# Patient Record
Sex: FEMALE | Race: BLACK OR AFRICAN AMERICAN | NOT HISPANIC OR LATINO | Employment: FULL TIME | URBAN - METROPOLITAN AREA
[De-identification: names, ages, dates, MRNs, and addresses within clinical notes are randomized per-mention and may not be internally consistent; named-entity substitution may affect disease eponyms.]

---

## 2023-06-05 ENCOUNTER — HOSPITAL ENCOUNTER (INPATIENT)
Facility: OTHER | Age: 58
LOS: 2 days | Discharge: HOME | DRG: 074 | End: 2023-06-08
Attending: EMERGENCY MEDICINE | Admitting: EMERGENCY MEDICINE
Payer: COMMERCIAL

## 2023-06-05 DIAGNOSIS — R11.2 NAUSEA VOMITING AND DIARRHEA: ICD-10-CM

## 2023-06-05 DIAGNOSIS — R11.10 VOMITING: ICD-10-CM

## 2023-06-05 DIAGNOSIS — R19.7 NAUSEA VOMITING AND DIARRHEA: ICD-10-CM

## 2023-06-05 DIAGNOSIS — E87.29 METABOLIC ACIDOSIS, INCREASED ANION GAP (IAG): ICD-10-CM

## 2023-06-05 DIAGNOSIS — E11.43 DIABETIC GASTROPARESIS: ICD-10-CM

## 2023-06-05 DIAGNOSIS — N17.9 AKI (ACUTE KIDNEY INJURY): Primary | ICD-10-CM

## 2023-06-05 DIAGNOSIS — K31.84 DIABETIC GASTROPARESIS: ICD-10-CM

## 2023-06-05 DIAGNOSIS — R19.7 DIARRHEA, UNSPECIFIED TYPE: ICD-10-CM

## 2023-06-05 LAB
ALBUMIN SERPL BCP-MCNC: 4.8 G/DL (ref 3.5–5.2)
ALP SERPL-CCNC: 142 U/L (ref 55–135)
ALT SERPL W/O P-5'-P-CCNC: 22 U/L (ref 10–44)
ANION GAP SERPL CALC-SCNC: 23 MMOL/L (ref 8–16)
AST SERPL-CCNC: 21 U/L (ref 10–40)
BACTERIA #/AREA URNS HPF: ABNORMAL /HPF
BASOPHILS # BLD AUTO: 0.02 K/UL (ref 0–0.2)
BASOPHILS NFR BLD: 0.2 % (ref 0–1.9)
BILIRUB SERPL-MCNC: 0.5 MG/DL (ref 0.1–1)
BILIRUB UR QL STRIP: NEGATIVE
BUN SERPL-MCNC: 55 MG/DL (ref 6–20)
CALCIUM SERPL-MCNC: 10.4 MG/DL (ref 8.7–10.5)
CHLORIDE SERPL-SCNC: 106 MMOL/L (ref 95–110)
CLARITY UR: ABNORMAL
CO2 SERPL-SCNC: 16 MMOL/L (ref 23–29)
COLOR UR: YELLOW
CREAT SERPL-MCNC: 2.6 MG/DL (ref 0.5–1.4)
DIFFERENTIAL METHOD: ABNORMAL
EOSINOPHIL # BLD AUTO: 0 K/UL (ref 0–0.5)
EOSINOPHIL NFR BLD: 0.2 % (ref 0–8)
ERYTHROCYTE [DISTWIDTH] IN BLOOD BY AUTOMATED COUNT: 13.4 % (ref 11.5–14.5)
EST. GFR  (NO RACE VARIABLE): 21 ML/MIN/1.73 M^2
FIO2: 21
GLUCOSE SERPL-MCNC: 59 MG/DL (ref 70–110)
GLUCOSE UR QL STRIP: ABNORMAL
HCO3 UR-SCNC: 22.5 MMOL/L (ref 24–28)
HCT VFR BLD AUTO: 53.8 % (ref 37–48.5)
HCT VFR BLD CALC: 43 %PCV (ref 36–54)
HGB BLD-MCNC: 15 G/DL
HGB BLD-MCNC: 18.1 G/DL (ref 12–16)
HGB UR QL STRIP: NEGATIVE
HYALINE CASTS #/AREA URNS LPF: 37 /LPF
IMM GRANULOCYTES # BLD AUTO: 0.06 K/UL (ref 0–0.04)
IMM GRANULOCYTES NFR BLD AUTO: 0.5 % (ref 0–0.5)
KETONES UR QL STRIP: ABNORMAL
LEUKOCYTE ESTERASE UR QL STRIP: ABNORMAL
LIPASE SERPL-CCNC: 17 U/L (ref 4–60)
LYMPHOCYTES # BLD AUTO: 1.8 K/UL (ref 1–4.8)
LYMPHOCYTES NFR BLD: 16.1 % (ref 18–48)
MCH RBC QN AUTO: 30.2 PG (ref 27–31)
MCHC RBC AUTO-ENTMCNC: 33.6 G/DL (ref 32–36)
MCV RBC AUTO: 90 FL (ref 82–98)
MICROSCOPIC COMMENT: ABNORMAL
MONOCYTES # BLD AUTO: 0.5 K/UL (ref 0.3–1)
MONOCYTES NFR BLD: 4.5 % (ref 4–15)
NEUTROPHILS # BLD AUTO: 9 K/UL (ref 1.8–7.7)
NEUTROPHILS NFR BLD: 78.5 % (ref 38–73)
NITRITE UR QL STRIP: NEGATIVE
NRBC BLD-RTO: 0 /100 WBC
PCO2 BLDA: 45.4 MMHG (ref 35–45)
PH SMN: 7.3 [PH] (ref 7.35–7.45)
PH UR STRIP: 6 [PH] (ref 5–8)
PLATELET # BLD AUTO: 213 K/UL (ref 150–450)
PLATELET BLD QL SMEAR: ABNORMAL
PMV BLD AUTO: 10.9 FL (ref 9.2–12.9)
PO2 BLDA: 35 MMHG (ref 40–60)
POC BE: -4 MMOL/L
POC IONIZED CALCIUM: 1.2 MMOL/L (ref 1.06–1.42)
POC PCO2 TEMP: 45.4 MMHG
POC PH TEMP: 7.3
POC PO2 TEMP: 35 MMHG
POC SATURATED O2: 60 % (ref 95–100)
POC TCO2: 24 MMOL/L (ref 24–29)
POC TEMPERATURE: ABNORMAL
POCT GLUCOSE: 112 MG/DL (ref 70–110)
POCT GLUCOSE: 59 MG/DL (ref 70–110)
POCT GLUCOSE: 60 MG/DL (ref 70–110)
POTASSIUM BLD-SCNC: 4.3 MMOL/L (ref 3.5–5.1)
POTASSIUM SERPL-SCNC: 4 MMOL/L (ref 3.5–5.1)
PROT SERPL-MCNC: 9 G/DL (ref 6–8.4)
PROT UR QL STRIP: ABNORMAL
RBC # BLD AUTO: 6 M/UL (ref 4–5.4)
RBC #/AREA URNS HPF: 2 /HPF (ref 0–4)
SAMPLE: ABNORMAL
SODIUM BLD-SCNC: 145 MMOL/L (ref 136–145)
SODIUM SERPL-SCNC: 145 MMOL/L (ref 136–145)
SP GR UR STRIP: 1.02 (ref 1–1.03)
SQUAMOUS #/AREA URNS HPF: 37 /HPF
URN SPEC COLLECT METH UR: ABNORMAL
UROBILINOGEN UR STRIP-ACNC: NEGATIVE EU/DL
WBC # BLD AUTO: 11.4 K/UL (ref 3.9–12.7)
WBC #/AREA URNS HPF: 4 /HPF (ref 0–5)
YEAST URNS QL MICRO: ABNORMAL

## 2023-06-05 PROCEDURE — 99900035 HC TECH TIME PER 15 MIN (STAT)

## 2023-06-05 PROCEDURE — 80053 COMPREHEN METABOLIC PANEL: CPT | Performed by: EMERGENCY MEDICINE

## 2023-06-05 PROCEDURE — 81000 URINALYSIS NONAUTO W/SCOPE: CPT | Performed by: PHYSICIAN ASSISTANT

## 2023-06-05 PROCEDURE — 96376 TX/PRO/DX INJ SAME DRUG ADON: CPT

## 2023-06-05 PROCEDURE — G0378 HOSPITAL OBSERVATION PER HR: HCPCS

## 2023-06-05 PROCEDURE — 96372 THER/PROPH/DIAG INJ SC/IM: CPT | Performed by: PHYSICIAN ASSISTANT

## 2023-06-05 PROCEDURE — 83690 ASSAY OF LIPASE: CPT | Performed by: EMERGENCY MEDICINE

## 2023-06-05 PROCEDURE — 25000003 PHARM REV CODE 250: Performed by: PHYSICIAN ASSISTANT

## 2023-06-05 PROCEDURE — 96361 HYDRATE IV INFUSION ADD-ON: CPT

## 2023-06-05 PROCEDURE — 96374 THER/PROPH/DIAG INJ IV PUSH: CPT

## 2023-06-05 PROCEDURE — 82803 BLOOD GASES ANY COMBINATION: CPT

## 2023-06-05 PROCEDURE — 63600175 PHARM REV CODE 636 W HCPCS: Performed by: PHYSICIAN ASSISTANT

## 2023-06-05 PROCEDURE — 82962 GLUCOSE BLOOD TEST: CPT

## 2023-06-05 PROCEDURE — 96375 TX/PRO/DX INJ NEW DRUG ADDON: CPT

## 2023-06-05 PROCEDURE — 85025 COMPLETE CBC W/AUTO DIFF WBC: CPT | Performed by: PHYSICIAN ASSISTANT

## 2023-06-05 RX ORDER — DICYCLOMINE HYDROCHLORIDE 10 MG/ML
20 INJECTION INTRAMUSCULAR
Status: COMPLETED | OUTPATIENT
Start: 2023-06-05 | End: 2023-06-05

## 2023-06-05 RX ORDER — ONDANSETRON 2 MG/ML
4 INJECTION INTRAMUSCULAR; INTRAVENOUS
Status: COMPLETED | OUTPATIENT
Start: 2023-06-05 | End: 2023-06-05

## 2023-06-05 RX ORDER — TALC
6 POWDER (GRAM) TOPICAL NIGHTLY PRN
Status: DISCONTINUED | OUTPATIENT
Start: 2023-06-06 | End: 2023-06-08 | Stop reason: HOSPADM

## 2023-06-05 RX ORDER — ONDANSETRON 2 MG/ML
4 INJECTION INTRAMUSCULAR; INTRAVENOUS EVERY 8 HOURS PRN
Status: DISCONTINUED | OUTPATIENT
Start: 2023-06-06 | End: 2023-06-06

## 2023-06-05 RX ORDER — SODIUM CHLORIDE 0.9 % (FLUSH) 0.9 %
10 SYRINGE (ML) INJECTION
Status: DISCONTINUED | OUTPATIENT
Start: 2023-06-06 | End: 2023-06-08 | Stop reason: HOSPADM

## 2023-06-05 RX ORDER — LOPERAMIDE HYDROCHLORIDE 2 MG/1
4 CAPSULE ORAL ONCE AS NEEDED
Status: COMPLETED | OUTPATIENT
Start: 2023-06-06 | End: 2023-06-08

## 2023-06-05 RX ORDER — ACETAMINOPHEN 325 MG/1
650 TABLET ORAL EVERY 8 HOURS PRN
Status: DISCONTINUED | OUTPATIENT
Start: 2023-06-06 | End: 2023-06-06

## 2023-06-05 RX ORDER — GLUCAGON 1 MG
1 KIT INJECTION
Status: DISCONTINUED | OUTPATIENT
Start: 2023-06-06 | End: 2023-06-08 | Stop reason: HOSPADM

## 2023-06-05 RX ORDER — LOPERAMIDE HYDROCHLORIDE 2 MG/1
4 CAPSULE ORAL ONCE
Status: DISCONTINUED | OUTPATIENT
Start: 2023-06-06 | End: 2023-06-05

## 2023-06-05 RX ORDER — HYDROCODONE BITARTRATE AND ACETAMINOPHEN 5; 325 MG/1; MG/1
1 TABLET ORAL EVERY 4 HOURS PRN
Status: DISCONTINUED | OUTPATIENT
Start: 2023-06-06 | End: 2023-06-08 | Stop reason: HOSPADM

## 2023-06-05 RX ORDER — SODIUM CHLORIDE 9 MG/ML
1000 INJECTION, SOLUTION INTRAVENOUS
Status: COMPLETED | OUTPATIENT
Start: 2023-06-05 | End: 2023-06-06

## 2023-06-05 RX ORDER — PROMETHAZINE HYDROCHLORIDE 12.5 MG/1
25 TABLET ORAL EVERY 6 HOURS PRN
Status: DISCONTINUED | OUTPATIENT
Start: 2023-06-06 | End: 2023-06-06

## 2023-06-05 RX ORDER — INSULIN ASPART 100 [IU]/ML
0-5 INJECTION, SOLUTION INTRAVENOUS; SUBCUTANEOUS EVERY 6 HOURS PRN
Status: DISCONTINUED | OUTPATIENT
Start: 2023-06-06 | End: 2023-06-06

## 2023-06-05 RX ADMIN — SODIUM CHLORIDE 1000 ML: 9 INJECTION, SOLUTION INTRAVENOUS at 11:06

## 2023-06-05 RX ADMIN — DEXTROSE MONOHYDRATE 125 ML: 100 INJECTION, SOLUTION INTRAVENOUS at 09:06

## 2023-06-05 RX ADMIN — ONDANSETRON 4 MG: 2 INJECTION INTRAMUSCULAR; INTRAVENOUS at 08:06

## 2023-06-05 RX ADMIN — ONDANSETRON 4 MG: 2 INJECTION INTRAMUSCULAR; INTRAVENOUS at 09:06

## 2023-06-05 RX ADMIN — SODIUM CHLORIDE 1000 ML: 9 INJECTION, SOLUTION INTRAVENOUS at 08:06

## 2023-06-05 RX ADMIN — DICYCLOMINE HYDROCHLORIDE 20 MG: 10 INJECTION, SOLUTION INTRAMUSCULAR at 09:06

## 2023-06-05 NOTE — LETTER
June 8, 2023         [address]  [facility] [address]  Phone: [phone]  Fax: [phone]       Patient: Lindsay Lou   YOB: 1965  Date of Visit: 06/08/2023    To Whom It May Concern:    Lindsay Lou  was under my care 6/5/23-6/8/23, please excuse her for any absences. The patient may return to work on 6/11/23 with no restrictions. If you have any questions or concerns, or if I can be of further assistance, please do not hesitate to contact me.    Sincerely,    Venkatesh Colmenares MD

## 2023-06-05 NOTE — Clinical Note
Diagnosis: KORY (acute kidney injury) [475832]   Future Attending Provider: TOR FORD [[id number]]   Is the patient being sent to ED Observation?: Yes   Admitting Provider:: TOR FORD [[id number]]

## 2023-06-06 PROBLEM — R11.2 INTRACTABLE NAUSEA AND VOMITING: Status: ACTIVE | Noted: 2021-05-13

## 2023-06-06 PROBLEM — F17.210 CIGARETTE NICOTINE DEPENDENCE WITHOUT COMPLICATION: Chronic | Status: ACTIVE | Noted: 2023-06-06

## 2023-06-06 PROBLEM — N17.9 AKI (ACUTE KIDNEY INJURY): Status: ACTIVE | Noted: 2023-06-06

## 2023-06-06 PROBLEM — I10 ESSENTIAL HYPERTENSION: Status: ACTIVE | Noted: 2023-06-06

## 2023-06-06 PROBLEM — E87.29 METABOLIC ACIDOSIS, INCREASED ANION GAP (IAG): Status: ACTIVE | Noted: 2023-06-06

## 2023-06-06 PROBLEM — R19.7 DIARRHEA: Status: ACTIVE | Noted: 2023-06-06

## 2023-06-06 PROBLEM — R19.7 NAUSEA VOMITING AND DIARRHEA: Status: ACTIVE | Noted: 2021-05-13

## 2023-06-06 LAB
ALBUMIN SERPL BCP-MCNC: 3.9 G/DL (ref 3.5–5.2)
ALP SERPL-CCNC: 116 U/L (ref 55–135)
ALT SERPL W/O P-5'-P-CCNC: 19 U/L (ref 10–44)
ANION GAP SERPL CALC-SCNC: 12 MMOL/L (ref 8–16)
ANION GAP SERPL CALC-SCNC: 13 MMOL/L (ref 8–16)
AST SERPL-CCNC: 20 U/L (ref 10–40)
B-OH-BUTYR BLD STRIP-SCNC: 1.3 MMOL/L (ref 0–0.5)
BASOPHILS # BLD AUTO: 0.01 K/UL (ref 0–0.2)
BASOPHILS NFR BLD: 0.1 % (ref 0–1.9)
BILIRUB SERPL-MCNC: 0.5 MG/DL (ref 0.1–1)
BUN SERPL-MCNC: 39 MG/DL (ref 6–20)
BUN SERPL-MCNC: 39 MG/DL (ref 6–20)
CALCIUM SERPL-MCNC: 8.9 MG/DL (ref 8.7–10.5)
CALCIUM SERPL-MCNC: 9.1 MG/DL (ref 8.7–10.5)
CHLORIDE SERPL-SCNC: 111 MMOL/L (ref 95–110)
CHLORIDE SERPL-SCNC: 111 MMOL/L (ref 95–110)
CO2 SERPL-SCNC: 21 MMOL/L (ref 23–29)
CO2 SERPL-SCNC: 21 MMOL/L (ref 23–29)
CREAT SERPL-MCNC: 1.6 MG/DL (ref 0.5–1.4)
CREAT SERPL-MCNC: 1.6 MG/DL (ref 0.5–1.4)
DIFFERENTIAL METHOD: ABNORMAL
EOSINOPHIL # BLD AUTO: 0 K/UL (ref 0–0.5)
EOSINOPHIL NFR BLD: 0 % (ref 0–8)
ERYTHROCYTE [DISTWIDTH] IN BLOOD BY AUTOMATED COUNT: 13.5 % (ref 11.5–14.5)
EST. GFR  (NO RACE VARIABLE): 37 ML/MIN/1.73 M^2
EST. GFR  (NO RACE VARIABLE): 37 ML/MIN/1.73 M^2
FIO2: 21
GLUCOSE SERPL-MCNC: 100 MG/DL (ref 70–110)
GLUCOSE SERPL-MCNC: 96 MG/DL (ref 70–110)
HCT VFR BLD AUTO: 42.6 % (ref 37–48.5)
HGB BLD-MCNC: 14.6 G/DL (ref 12–16)
IMM GRANULOCYTES # BLD AUTO: 0.05 K/UL (ref 0–0.04)
IMM GRANULOCYTES NFR BLD AUTO: 0.5 % (ref 0–0.5)
LDH SERPL L TO P-CCNC: 0.58 MMOL/L (ref 0.5–2.2)
LYMPHOCYTES # BLD AUTO: 1.8 K/UL (ref 1–4.8)
LYMPHOCYTES NFR BLD: 17.3 % (ref 18–48)
MCH RBC QN AUTO: 30.4 PG (ref 27–31)
MCHC RBC AUTO-ENTMCNC: 34.3 G/DL (ref 32–36)
MCV RBC AUTO: 89 FL (ref 82–98)
MONOCYTES # BLD AUTO: 0.5 K/UL (ref 0.3–1)
MONOCYTES NFR BLD: 5 % (ref 4–15)
NEUTROPHILS # BLD AUTO: 7.9 K/UL (ref 1.8–7.7)
NEUTROPHILS NFR BLD: 77.1 % (ref 38–73)
NRBC BLD-RTO: 0 /100 WBC
PLATELET # BLD AUTO: 295 K/UL (ref 150–450)
PMV BLD AUTO: 9.6 FL (ref 9.2–12.9)
POC TEMPERATURE: NORMAL
POCT GLUCOSE: 104 MG/DL (ref 70–110)
POCT GLUCOSE: 113 MG/DL (ref 70–110)
POCT GLUCOSE: 128 MG/DL (ref 70–110)
POCT GLUCOSE: 169 MG/DL (ref 70–110)
POCT GLUCOSE: 43 MG/DL (ref 70–110)
POCT GLUCOSE: 59 MG/DL (ref 70–110)
POCT GLUCOSE: 61 MG/DL (ref 70–110)
POCT GLUCOSE: 62 MG/DL (ref 70–110)
POCT GLUCOSE: 68 MG/DL (ref 70–110)
POCT GLUCOSE: 78 MG/DL (ref 70–110)
POCT GLUCOSE: 88 MG/DL (ref 70–110)
POCT GLUCOSE: 92 MG/DL (ref 70–110)
POTASSIUM SERPL-SCNC: 3.9 MMOL/L (ref 3.5–5.1)
POTASSIUM SERPL-SCNC: 4 MMOL/L (ref 3.5–5.1)
PROT SERPL-MCNC: 7.3 G/DL (ref 6–8.4)
RBC # BLD AUTO: 4.81 M/UL (ref 4–5.4)
SAMPLE: NORMAL
SODIUM SERPL-SCNC: 144 MMOL/L (ref 136–145)
SODIUM SERPL-SCNC: 145 MMOL/L (ref 136–145)
WBC # BLD AUTO: 10.25 K/UL (ref 3.9–12.7)

## 2023-06-06 PROCEDURE — 63600175 PHARM REV CODE 636 W HCPCS

## 2023-06-06 PROCEDURE — 63600175 PHARM REV CODE 636 W HCPCS: Performed by: PHYSICIAN ASSISTANT

## 2023-06-06 PROCEDURE — 25000003 PHARM REV CODE 250: Performed by: PHYSICIAN ASSISTANT

## 2023-06-06 PROCEDURE — 96361 HYDRATE IV INFUSION ADD-ON: CPT

## 2023-06-06 PROCEDURE — 99223 1ST HOSP IP/OBS HIGH 75: CPT | Mod: ,,,

## 2023-06-06 PROCEDURE — 82010 KETONE BODYS QUAN: CPT | Performed by: EMERGENCY MEDICINE

## 2023-06-06 PROCEDURE — 93005 ELECTROCARDIOGRAM TRACING: CPT

## 2023-06-06 PROCEDURE — 36415 COLL VENOUS BLD VENIPUNCTURE: CPT | Performed by: EMERGENCY MEDICINE

## 2023-06-06 PROCEDURE — 93010 EKG 12-LEAD: ICD-10-PCS | Mod: ,,, | Performed by: INTERNAL MEDICINE

## 2023-06-06 PROCEDURE — 80053 COMPREHEN METABOLIC PANEL: CPT | Performed by: PHYSICIAN ASSISTANT

## 2023-06-06 PROCEDURE — 25000003 PHARM REV CODE 250

## 2023-06-06 PROCEDURE — 99900035 HC TECH TIME PER 15 MIN (STAT)

## 2023-06-06 PROCEDURE — 21400001 HC TELEMETRY ROOM

## 2023-06-06 PROCEDURE — 85025 COMPLETE CBC W/AUTO DIFF WBC: CPT | Performed by: PHYSICIAN ASSISTANT

## 2023-06-06 PROCEDURE — 63600175 PHARM REV CODE 636 W HCPCS: Performed by: EMERGENCY MEDICINE

## 2023-06-06 PROCEDURE — 80048 BASIC METABOLIC PNL TOTAL CA: CPT | Mod: XB | Performed by: EMERGENCY MEDICINE

## 2023-06-06 PROCEDURE — 96374 THER/PROPH/DIAG INJ IV PUSH: CPT | Mod: 59

## 2023-06-06 PROCEDURE — 96375 TX/PRO/DX INJ NEW DRUG ADDON: CPT

## 2023-06-06 PROCEDURE — 83605 ASSAY OF LACTIC ACID: CPT

## 2023-06-06 PROCEDURE — 96376 TX/PRO/DX INJ SAME DRUG ADON: CPT

## 2023-06-06 PROCEDURE — 99223 PR INITIAL HOSPITAL CARE,LEVL III: ICD-10-PCS | Mod: ,,,

## 2023-06-06 PROCEDURE — 82962 GLUCOSE BLOOD TEST: CPT

## 2023-06-06 PROCEDURE — 93010 ELECTROCARDIOGRAM REPORT: CPT | Mod: ,,, | Performed by: INTERNAL MEDICINE

## 2023-06-06 PROCEDURE — 99285 EMERGENCY DEPT VISIT HI MDM: CPT | Mod: 25

## 2023-06-06 RX ORDER — SUCRALFATE 1 G/10ML
1 SUSPENSION ORAL EVERY 8 HOURS PRN
Status: DISCONTINUED | OUTPATIENT
Start: 2023-06-06 | End: 2023-06-06

## 2023-06-06 RX ORDER — BLOOD-GLUCOSE,RECEIVER,CONT
1 EACH MISCELLANEOUS
COMMUNITY
Start: 2022-10-20

## 2023-06-06 RX ORDER — SODIUM CHLORIDE 9 MG/ML
INJECTION, SOLUTION INTRAVENOUS CONTINUOUS
Status: DISCONTINUED | OUTPATIENT
Start: 2023-06-06 | End: 2023-06-07

## 2023-06-06 RX ORDER — HYDRALAZINE HYDROCHLORIDE 20 MG/ML
10 INJECTION INTRAMUSCULAR; INTRAVENOUS EVERY 8 HOURS PRN
Status: DISCONTINUED | OUTPATIENT
Start: 2023-06-06 | End: 2023-06-08 | Stop reason: HOSPADM

## 2023-06-06 RX ORDER — INSULIN ASPART 100 [IU]/ML
0-5 INJECTION, SOLUTION INTRAVENOUS; SUBCUTANEOUS EVERY 4 HOURS PRN
Status: DISCONTINUED | OUTPATIENT
Start: 2023-06-06 | End: 2023-06-08 | Stop reason: HOSPADM

## 2023-06-06 RX ORDER — DEXTROSE 40 %
15 GEL (GRAM) ORAL
Status: DISCONTINUED | OUTPATIENT
Start: 2023-06-06 | End: 2023-06-08 | Stop reason: HOSPADM

## 2023-06-06 RX ORDER — LISINOPRIL 20 MG/1
20 TABLET ORAL DAILY
COMMUNITY
Start: 2023-05-08

## 2023-06-06 RX ORDER — BLOOD-GLUCOSE TRANSMITTER
1 EACH MISCELLANEOUS
COMMUNITY
Start: 2022-10-20

## 2023-06-06 RX ORDER — DEXTROSE, SODIUM CHLORIDE, SODIUM LACTATE, POTASSIUM CHLORIDE, AND CALCIUM CHLORIDE 5; .6; .31; .03; .02 G/100ML; G/100ML; G/100ML; G/100ML; G/100ML
INJECTION, SOLUTION INTRAVENOUS
Status: COMPLETED | OUTPATIENT
Start: 2023-06-06 | End: 2023-06-06

## 2023-06-06 RX ORDER — BLOOD-GLUCOSE SENSOR
EACH MISCELLANEOUS 4 TIMES DAILY
COMMUNITY
Start: 2023-05-08

## 2023-06-06 RX ORDER — SUCRALFATE 1 G/10ML
1 SUSPENSION ORAL EVERY 8 HOURS PRN
Status: DISCONTINUED | OUTPATIENT
Start: 2023-06-06 | End: 2023-06-08 | Stop reason: HOSPADM

## 2023-06-06 RX ORDER — ONDANSETRON 2 MG/ML
4 INJECTION INTRAMUSCULAR; INTRAVENOUS EVERY 8 HOURS
Status: DISCONTINUED | OUTPATIENT
Start: 2023-06-06 | End: 2023-06-08 | Stop reason: HOSPADM

## 2023-06-06 RX ORDER — EMPAGLIFLOZIN 10 MG/1
10 TABLET, FILM COATED ORAL EVERY MORNING
COMMUNITY
Start: 2023-05-08

## 2023-06-06 RX ORDER — METOCLOPRAMIDE HYDROCHLORIDE 5 MG/ML
10 INJECTION INTRAMUSCULAR; INTRAVENOUS
Status: COMPLETED | OUTPATIENT
Start: 2023-06-06 | End: 2023-06-06

## 2023-06-06 RX ORDER — DEXTROSE 40 %
30 GEL (GRAM) ORAL
Status: DISCONTINUED | OUTPATIENT
Start: 2023-06-06 | End: 2023-06-08 | Stop reason: HOSPADM

## 2023-06-06 RX ORDER — LISINOPRIL 20 MG/1
20 TABLET ORAL DAILY
Status: DISCONTINUED | OUTPATIENT
Start: 2023-06-07 | End: 2023-06-06

## 2023-06-06 RX ORDER — SCOLOPAMINE TRANSDERMAL SYSTEM 1 MG/1
1 PATCH, EXTENDED RELEASE TRANSDERMAL
Status: DISCONTINUED | OUTPATIENT
Start: 2023-06-06 | End: 2023-06-08 | Stop reason: HOSPADM

## 2023-06-06 RX ORDER — ACETAMINOPHEN 500 MG
1000 TABLET ORAL EVERY 8 HOURS PRN
Status: DISCONTINUED | OUTPATIENT
Start: 2023-06-06 | End: 2023-06-08 | Stop reason: HOSPADM

## 2023-06-06 RX ORDER — MAG HYDROX/ALUMINUM HYD/SIMETH 200-200-20
30 SUSPENSION, ORAL (FINAL DOSE FORM) ORAL
Status: DISCONTINUED | OUTPATIENT
Start: 2023-06-06 | End: 2023-06-06

## 2023-06-06 RX ORDER — NAPROXEN SODIUM 220 MG/1
TABLET, FILM COATED ORAL
COMMUNITY

## 2023-06-06 RX ORDER — ONDANSETRON 2 MG/ML
4 INJECTION INTRAMUSCULAR; INTRAVENOUS
Status: COMPLETED | OUTPATIENT
Start: 2023-06-06 | End: 2023-06-06

## 2023-06-06 RX ORDER — MAG HYDROX/ALUMINUM HYD/SIMETH 200-200-20
30 SUSPENSION, ORAL (FINAL DOSE FORM) ORAL EVERY 6 HOURS PRN
Status: DISCONTINUED | OUTPATIENT
Start: 2023-06-06 | End: 2023-06-08 | Stop reason: HOSPADM

## 2023-06-06 RX ORDER — PANTOPRAZOLE SODIUM 40 MG/10ML
40 INJECTION, POWDER, LYOPHILIZED, FOR SOLUTION INTRAVENOUS DAILY
Status: DISCONTINUED | OUTPATIENT
Start: 2023-06-07 | End: 2023-06-07

## 2023-06-06 RX ORDER — DICYCLOMINE HYDROCHLORIDE 10 MG/ML
20 INJECTION INTRAMUSCULAR 3 TIMES DAILY
Status: DISCONTINUED | OUTPATIENT
Start: 2023-06-06 | End: 2023-06-06

## 2023-06-06 RX ORDER — DICYCLOMINE HYDROCHLORIDE 10 MG/1
20 CAPSULE ORAL 3 TIMES DAILY
Status: DISCONTINUED | OUTPATIENT
Start: 2023-06-06 | End: 2023-06-08 | Stop reason: HOSPADM

## 2023-06-06 RX ORDER — INSULIN GLARGINE 100 [IU]/ML
28 INJECTION, SOLUTION SUBCUTANEOUS NIGHTLY
COMMUNITY

## 2023-06-06 RX ORDER — ERGOCALCIFEROL 1.25 MG/1
50000 CAPSULE ORAL
COMMUNITY
Start: 2023-05-08

## 2023-06-06 RX ORDER — NAPROXEN SODIUM 220 MG/1
81 TABLET, FILM COATED ORAL DAILY
Status: DISCONTINUED | OUTPATIENT
Start: 2023-06-07 | End: 2023-06-08 | Stop reason: HOSPADM

## 2023-06-06 RX ADMIN — INSULIN DETEMIR 14 UNITS: 100 INJECTION, SOLUTION SUBCUTANEOUS at 08:06

## 2023-06-06 RX ADMIN — ONDANSETRON 4 MG: 2 INJECTION INTRAMUSCULAR; INTRAVENOUS at 09:06

## 2023-06-06 RX ADMIN — DEXTROSE MONOHYDRATE 125 ML: 100 INJECTION, SOLUTION INTRAVENOUS at 02:06

## 2023-06-06 RX ADMIN — DEXTROSE MONOHYDRATE 250 ML: 100 INJECTION, SOLUTION INTRAVENOUS at 08:06

## 2023-06-06 RX ADMIN — ACETAMINOPHEN 650 MG: 325 TABLET, FILM COATED ORAL at 08:06

## 2023-06-06 RX ADMIN — DICYCLOMINE HYDROCHLORIDE 20 MG: 10 CAPSULE ORAL at 09:06

## 2023-06-06 RX ADMIN — METOCLOPRAMIDE 10 MG: 5 INJECTION, SOLUTION INTRAMUSCULAR; INTRAVENOUS at 11:06

## 2023-06-06 RX ADMIN — DEXTROSE MONOHYDRATE 250 ML: 100 INJECTION, SOLUTION INTRAVENOUS at 04:06

## 2023-06-06 RX ADMIN — PROMETHAZINE HYDROCHLORIDE 25 MG: 25 TABLET ORAL at 09:06

## 2023-06-06 RX ADMIN — PROMETHAZINE HYDROCHLORIDE 6.25 MG: 25 INJECTION INTRAMUSCULAR; INTRAVENOUS at 08:06

## 2023-06-06 RX ADMIN — ONDANSETRON 4 MG: 2 INJECTION INTRAMUSCULAR; INTRAVENOUS at 08:06

## 2023-06-06 RX ADMIN — DEXTROSE, SODIUM CHLORIDE, SODIUM LACTATE, POTASSIUM CHLORIDE, AND CALCIUM CHLORIDE: 5; .6; .31; .03; .02 INJECTION, SOLUTION INTRAVENOUS at 11:06

## 2023-06-06 RX ADMIN — HYDRALAZINE HYDROCHLORIDE 10 MG: 20 INJECTION INTRAMUSCULAR; INTRAVENOUS at 07:06

## 2023-06-06 RX ADMIN — SCOLOPAMINE TRANSDERMAL SYSTEM 1 PATCH: 1 PATCH, EXTENDED RELEASE TRANSDERMAL at 04:06

## 2023-06-06 RX ADMIN — DEXTROSE MONOHYDRATE 125 ML: 100 INJECTION, SOLUTION INTRAVENOUS at 12:06

## 2023-06-06 RX ADMIN — SODIUM CHLORIDE: 9 INJECTION, SOLUTION INTRAVENOUS at 03:06

## 2023-06-06 RX ADMIN — ONDANSETRON 4 MG: 2 INJECTION INTRAMUSCULAR; INTRAVENOUS at 04:06

## 2023-06-06 RX ADMIN — ONDANSETRON 4 MG: 2 INJECTION INTRAMUSCULAR; INTRAVENOUS at 01:06

## 2023-06-06 NOTE — ED NOTES
Pt continues to c/o nausea. Pt received 2 doses of zofran IV in ED tonight. MD notified. Received verbal order from Dr Sherwood to give additional dose of 4mg zofran IV at this time. 3rd dose confirmed/verified with md.

## 2023-06-06 NOTE — ED NOTES
CBG 43. Pt denies any symptoms. Provided with 2 orange juices PO and Dextrose 10% IV. VIPIN Dinh notified. Will continue to monitor.

## 2023-06-06 NOTE — ED NOTES
Pt assigned to room 313. SBAR completed. Pt to be transferred via wheelchair to 313 with personal belongings. Safety measures in place.

## 2023-06-06 NOTE — NURSING TRANSFER
Nursing Transfer Note      6/6/2023     Reason patient is being transferred: to 313    Transfer From: ED    Transfer via wheelchair    Transfer with cardiac monitoring    Transported by transport staff    Telemetry: [id number]    Medicines sent: No    Any special needs or follow-up needed: No    Chart send with patient: No    Notified: pt notified family

## 2023-06-06 NOTE — ASSESSMENT & PLAN NOTE
"Chronic. Uncontrolled. Ongoing nausea.   Patient reports not taking prescribed lisinopril as her BP is "well-controlled". However, she does not appear to check her BP, not at least for about a month already. Also appears to have omitted aspirin, lovastatin from her daily med regimen as well. Counseled extensively on compliance to meds and checking BP, amendable to change.     - Hold home dose lisinopril in the setting of KORY  - Continue aspirin 81mg daily, lovastatin 10mg daily qhs  - IV hydralazine 10mg Q6 PRN for SBP >180 DBP >100  - Educate patient about continuing prescribed medications, daily BP checks, low salt diet when more comfortable   "

## 2023-06-06 NOTE — NURSING
Nurses Note -- 4 Eyes      6/6/2023   4:00 PM      Skin assessed during: Admit      [x] No Altered Skin Integrity Present    []Prevention Measures Documented      [] Yes- Altered Skin Integrity Present or Discovered   [] LDA Added if Not in Epic (Describe Wound)   [] New Altered Skin Integrity was Present on Admit and Documented in LDA   [] Wound Image Taken    Wound Care Consulted? No    Attending Nurse:  Kacie Hines RN     Second RN/Staff Member:  Augustina Cee LPN

## 2023-06-06 NOTE — ASSESSMENT & PLAN NOTE
Patient's FSGs are uncontrolled due to hypoglycemia on current medication regimen likely due to impaired oral intake for the last 2-3 days.     Last A1c reviewed-   Lab Results   Component Value Date    HGBA1C 11.8 (H) 12/22/2022     Most recent fingerstick glucose reviewed-   Recent Labs   Lab 06/06/23  0759 06/06/23  0854 06/06/23  1101 06/06/23  1159   POCTGLUCOSE 43* 169* 104 88     Current correctional scale  Low  Maintain anti-hyperglycemic dose as follows-   Antihyperglycemics (From admission, onward)    Start     Stop Route Frequency Ordered    06/06/23 2100  insulin detemir U-100 (Levemir) pen 14 Units         -- SubQ Nightly 06/06/23 1511    06/06/23 1645  insulin aspart U-100 pen 0-5 Units         -- SubQ Every 4 hours PRN 06/06/23 1546        Home meds:  Insulin Lantus 28U qhs  Insulin novolog 5U at lunch daily  Jardiance 10mg daily    Inpatient:  Decreased detemir to 14U qhs in the setting of hypolgycemia  POCT BG Q4 hrs for now until able to take orally, with correctional insulin aspart PRN  Hold Jardiance

## 2023-06-06 NOTE — ED PROVIDER NOTES
Encounter Date: 6/5/2023       History     Chief Complaint   Patient presents with    Vomiting     N/v/d x 2 days. Reports being seen at urgent care and sent to get fluids.      57-year-old female with history of type 2 diabetes on insulin presents ER for evaluation of nausea, vomiting and diarrhea ongoing for last 2 and half days.  Patient reports multiple episodes over last couple days.  Had 3 episodes of vomiting and diarrhea today.  Denies any abdominal pain but has had some very minimal cramping.  Denies any flank pain or UTI symptoms including dysuria hematuria.  She denies any fever chills green no known sick contacts.  No changes in diet or medication.  No recent antibiotic use.  She denies recent travel.  She did try to take over-the-counter medication but was not able to tolerate the medicine due to vomiting.  Patient does report taking her insulin this morning.  Has not had anything to eat for the last 2 days.  She went to urgent care but was told to come to the ER for IV hydration.  Denies any cough, congestion, URI symptoms.    The history is provided by the patient.   Review of patient's allergies indicates:   Allergen Reactions    Cephalexin Nausea And Vomiting    Pcn [penicillins] Nausea And Vomiting     Past Medical History:   Diagnosis Date    Abnormal cervical Papanicolaou smear ? 1990    colposcopy    Diabetes mellitus      No past surgical history on file.  Family History   Problem Relation Age of Onset    Colon cancer Neg Hx     Ovarian cancer Neg Hx     Breast cancer Maternal Aunt      Social History     Tobacco Use    Smoking status: Some Days    Smokeless tobacco: Never   Substance Use Topics    Alcohol use: No    Drug use: No     Review of Systems   Constitutional:  Negative for chills and fever.   HENT:  Negative for congestion.    Eyes:  Negative for visual disturbance.   Respiratory:  Negative for shortness of breath.    Cardiovascular:  Negative for chest pain.   Gastrointestinal:   Positive for diarrhea, nausea and vomiting. Negative for abdominal pain.   Genitourinary:  Negative for dysuria and flank pain.   Musculoskeletal:  Negative for myalgias.   Skin:  Negative for rash.   Allergic/Immunologic: Negative for immunocompromised state.   Neurological:  Negative for weakness and numbness.   Hematological:  Does not bruise/bleed easily.   Psychiatric/Behavioral:  Negative for confusion.      Physical Exam     Initial Vitals [06/05/23 1938]   BP Pulse Resp Temp SpO2   (!) 160/95 (!) 117 20 98.1 °F (36.7 °C) 100 %      MAP       --         Physical Exam    Vitals reviewed.  Constitutional: She appears well-developed and well-nourished. She is not diaphoretic. No distress.   HENT:   Head: Normocephalic and atraumatic.   Eyes: Conjunctivae and EOM are normal.   Neck: Neck supple.   Cardiovascular:  Regular rhythm.   Tachycardia present.         Pulmonary/Chest: No respiratory distress.   Abdominal: Abdomen is soft. Bowel sounds are normal. There is abdominal tenderness in the left lower quadrant.   No right CVA tenderness.  No left CVA tenderness. There is no rebound and no guarding.   Musculoskeletal:         General: Normal range of motion.      Cervical back: Neck supple.     Neurological: She is alert and oriented to person, place, and time.       ED Course   Procedures  Labs Reviewed   CBC W/ AUTO DIFFERENTIAL - Abnormal; Notable for the following components:       Result Value    RBC 6.00 (*)     Hemoglobin 18.1 (*)     Hematocrit 53.8 (*)     Gran # (ANC) 9.0 (*)     Immature Grans (Abs) 0.06 (*)     Gran % 78.5 (*)     Lymph % 16.1 (*)     All other components within normal limits   URINALYSIS, REFLEX TO URINE CULTURE - Abnormal; Notable for the following components:    Appearance, UA Hazy (*)     Protein, UA 1+ (*)     Glucose, UA 4+ (*)     Ketones, UA 2+ (*)     Leukocytes, UA 1+ (*)     All other components within normal limits    Narrative:     Specimen Source->Urine   COMPREHENSIVE  METABOLIC PANEL - Abnormal; Notable for the following components:    CO2 16 (*)     Glucose 59 (*)     BUN 55 (*)     Creatinine 2.6 (*)     Total Protein 9.0 (*)     Alkaline Phosphatase 142 (*)     Anion Gap 23 (*)     eGFR 21 (*)     All other components within normal limits   URINALYSIS MICROSCOPIC - Abnormal; Notable for the following components:    Bacteria Few (*)     Hyaline Casts, UA 37 (*)     All other components within normal limits    Narrative:     Specimen Source->Urine   POCT GLUCOSE - Abnormal; Notable for the following components:    POCT Glucose 60 (*)     All other components within normal limits   POCT GLUCOSE - Abnormal; Notable for the following components:    POCT Glucose 59 (*)     All other components within normal limits   POCT GLUCOSE - Abnormal; Notable for the following components:    POCT Glucose 112 (*)     All other components within normal limits   ISTAT PROCEDURE - Abnormal; Notable for the following components:    POC PH 7.303 (*)     POC PCO2 45.4 (*)     POC PO2 35 (*)     POC HCO3 22.5 (*)     POC SATURATED O2 60 (*)     All other components within normal limits   LIPASE   POCT GLUCOSE MONITORING CONTINUOUS   POCT GLUCOSE MONITORING CONTINUOUS   POCT GLUCOSE MONITORING CONTINUOUS          Imaging Results              CT Abdomen Pelvis  Without Contrast (Final result)  Result time 06/05/23 21:48:59   Procedure changed from CT Abdomen Pelvis With Contrast     Final result by Candy Whitfield MD (06/05/23 21:48:59)                   Impression:      1. No acute intra-abdominal abnormalities identified.  2. Cholelithiasis.  3. Two separate IUDs versus IUD fragments seen within the uterus.  Correlate with patient clinical history.      Electronically signed by: Candy Whitfield MD  Date:    06/05/2023  Time:    21:48               Narrative:    EXAMINATION:  CT ABDOMEN PELVIS WITHOUT CONTRAST    CLINICAL HISTORY:  LLQ abdominal pain;Nausea/vomiting;    TECHNIQUE:  Low dose axial  images, sagittal and coronal reformations were obtained from the lung bases to the pubic symphysis.  Oral contrast was not administered.    COMPARISON:  None    FINDINGS:  The visualized portion of the heart is unremarkable.  The lung bases are clear.    Small nonspecific parenchymal calcifications are seen within the inferior aspect of the right hepatic lobe.  Otherwise no significant hepatic abnormality seen on this noncontrast exam.  There is no intra-or extrahepatic biliary ductal dilatation.  There is cholelithiasis.  The stomach, pancreas, spleen, and adrenal glands are unremarkable.    Kidneys show no evidence of stones or hydronephrosis. Ureters are unable be tracked.  Urinary bladder is unremarkable.  Two IUDs versus separate IUD fragments are seen within the uterus.    Appendix is not definitely visualized, however no abnormalities or inflammatory changes are seen in the region.  The visualized loops of small and large bowel show no evidence of obstruction or inflammation.  No free air or free fluid.    Aorta tapers normally.    No acute osseous abnormality identified. Small fat containing umbilical hernia is noted.                                       Medications   dextrose 10% bolus 125 mL 125 mL (0 mLs Intravenous Stopped 6/5/23 0931)   dextrose 10% bolus 250 mL 250 mL (has no administration in time range)   sodium chloride 0.9% flush 10 mL (has no administration in time range)   melatonin tablet 6 mg (has no administration in time range)   acetaminophen tablet 650 mg (has no administration in time range)   HYDROcodone-acetaminophen 5-325 mg per tablet 1 tablet (has no administration in time range)   ondansetron injection 4 mg (has no administration in time range)   promethazine tablet 25 mg (has no administration in time range)   glucagon (human recombinant) injection 1 mg (has no administration in time range)   dextrose 10% bolus 125 mL 125 mL (has no administration in time range)   dextrose 10%  bolus 250 mL 250 mL (has no administration in time range)   insulin aspart U-100 pen 0-5 Units (has no administration in time range)   loperamide capsule 4 mg (has no administration in time range)   sodium chloride 0.9% bolus 1,000 mL 1,000 mL (0 mLs Intravenous Stopped 6/5/23 2130)   ondansetron injection 4 mg (4 mg Intravenous Given 6/5/23 2027)   dicyclomine injection 20 mg (20 mg Intramuscular Given 6/5/23 2111)   ondansetron injection 4 mg (4 mg Intravenous Given 6/5/23 2154)   0.9%  NaCl infusion (1,000 mLs Intravenous New Bag 6/5/23 2307)     Medical Decision Making:   Differential Diagnosis:   Enteritis, electrolyte derangement, diverticulitis, dehydration     APC / Resident Notes:   Patient seen in the ER promptly upon arrival.  She is afebrile.  No acute distress.  Slightly tachycardic on arrival.  Mild tenderness to the left lower quadrant of the abdomen.  Abdomen is otherwise soft, nondistended.  No CVA tenderness on exam.  IV access established, labs ordered.      ED Course as of 06/05/23 2345 Mon Jun 05, 2023 2137 Laboratory studies show normal white count of 11.4.  Hemoglobin slightly elevated 18.1.    CMP shows KORY creatinine of 2.6, BUN of 55.  GFR of 21.  This is new for patient.  She was given a L of fluids while in the ED. [AJ]   2138 Patient acidotic CO2 of 16, anion gap of 23 [AJ]   2138 Glucose of 59.  Patient was not able to tolerate p.o. juice.  Will provide dose of D10 [AJ]   2324 Repeat glucose of 112 [AJ]   2324 ISTAT PROCEDURE(!)  POC PH 7.303  POC PCO2 45.4  POC PO2 35  POC HCO3 22.5  POC BE -4  POC SATURATED O2 60      Metabolic acidosis noted   [AJ]   2325 Urinalysis does not show evidence of infection.  Positive keep tenderness likely secondary to dehydration. [AJ]   2325 Discussed with Hospital Medicine initially who requested for EDOU admission.     Patient does meet criteria EDOU observation .  On reassessment patient is resting comfortably.  No episodes of vomiting or  diarrhea while in the ER. [AJ]   2326 Plan:    IV fluids- /hr infusion  AM labs- cbc, cmp  Antiemetics, diarrheal and pain control  If KORY does not improve, obtain retroperitoneal US.  [AJ]   2328 Patient informed of the decision for observation, acknowledges and agrees to treatment plan.  She is remained stable during her stay the ED stable for transfer to [facility].     The care of this patient was overseen by attending physician who agrees with treatment, plan, and disposition.    Disclaimer: This note has been generated using voice-recognition software. There may be typographical errors that have been missed during proof-reading.     [AJ]      ED Course User Index  [AJ] Sosa Medina PA-C                 Clinical Impression:   Final diagnoses:  [N17.9] KORY (acute kidney injury) (Primary)  [E87.29] Metabolic acidosis, increased anion gap (IAG)  [R11.2, R19.7] Nausea vomiting and diarrhea        ED Disposition Condition    Observation Stable                Sosa Medina PA-C  06/05/23 2956

## 2023-06-06 NOTE — ASSESSMENT & PLAN NOTE
Nausea, vomiting and diarrhea - diarrhea resolved, continues to retch, unable to  tolerate oral fluids   Metabolic acidosis - improving    KORY - improving  2.5 days of nausea, non-bloody vomiting and diarrhea. Labs on arrival with Cr 2.6/BUN 55, AG 23, Bicarb 16. BHB 1.3. UA with ketonuria. VBG with pH 7.3, Bicarb 22.5. 14.6/42.6 after IV hydration. Cr improved to 1.6, BUN 39, which is still higher than baseline BUN 19/0.83 (12/22/22).     Plan:  - Continue IVF IV NaCl 0.9% 125ml/hr   - IV pantoprazole 40mg daily  - Continue anti-emetics, scheduled IV Ondansetron 4mg Q8, Transdermal scopolamine patch, IV promethazine 6.25mg Q6 PRN  - Avoid nephrotoxins, renally dose meds  - Advance diet as tolerated to clear liquids  - Consult GI in AM

## 2023-06-06 NOTE — ED NOTES
Pt c/o nausea, PRN medication available, pt unable to tolerate PO medication at this time, ED MD notified.

## 2023-06-06 NOTE — PLAN OF CARE
Problem: Adult Inpatient Plan of Care  Goal: Plan of Care Review  Outcome: Ongoing, Progressing  Goal: Patient-Specific Goal (Individualized)  Outcome: Ongoing, Progressing  Goal: Absence of Hospital-Acquired Illness or Injury  Outcome: Ongoing, Progressing  Goal: Readiness for Transition of Care  Outcome: Ongoing, Progressing     Problem: Diabetes Comorbidity  Goal: Blood Glucose Level Within Targeted Range  Outcome: Ongoing, Progressing     Problem: Fluid and Electrolyte Imbalance (Acute Kidney Injury/Impairment)  Goal: Fluid and Electrolyte Balance  Outcome: Ongoing, Progressing     Problem: Adult Inpatient Plan of Care  Goal: Optimal Comfort and Wellbeing  Outcome: Ongoing, Not Progressing     Problem: Oral Intake Inadequate (Acute Kidney Injury/Impairment)  Goal: Optimal Nutrition Intake  Outcome: Ongoing, Not Progressing     Pt alert and oriented. Afebrile. IV fluids continued. Oxygen maintained @ room air. Pt not tolerating clear liquid diet. Continued nausea. GI consulted. Low blood glucose. D10% given as ordered. Telemetry monitoring continued. Scopolamine patch placed behind left ear. Safety maintained.

## 2023-06-06 NOTE — HPI
Lindsay Conley Theophile is a 57 year old lady with hx of HTN, insulin-dependent DM, Vit D deficiency, 6 pack year smoking history current smoker, presenting with 2.5 days of nausea, vomiting and diarrhea. Patient reports no blood in emesis or stool. Patient denies sick contacts. Patient also reports crampy epigastric abdominal pain. Patient denies fever, chills, sore throat, dysuria, SOB or chest pain.     Afebrile, tachycardic, /95, Pox 100% on RA. Labs on arrival with no leukocytosis. H/H 18.1/53.8, Cr 2.6/BUN 55, AG 23, Bicarb 16. . BHB 1.3. UA with ketonuria. +1 leukocytes, no nitrites, 4 WBC. VBG with pH 7.3, Bicarb 22.5. CT Abdomen Pelvis without contrast with no acute intra-abdominal abnormalities identified. Cholelithiasis. Two separate IUDs versus IUD fragments seen within the uterus. In ED patient was given, 2 x 1L NaCl 0.9%, IV Ondansetron 4mg x 2, IV Metoclopramide 10mg, IV promethazine 25mg x 1. Patient also had a few episodes of hypoglycemia. H/H 14.6/42.6 after IV hydration. Cr improved to 1.6, BUN 39, which is still higher than baseline BUN 19/0.83 (12/22/22). AG closed, from 23 to 12, ALP normalized to 116. Patient continues to dry retch and unable to tolerate oral fluids, overall looking uncomfortable. Diarrhea has abated.     Patient is admitted to Hospital Medicine for intractable nausea and vomiting with KORY and metabolic acidosis. A consult will be placed to GI.

## 2023-06-06 NOTE — ED NOTES
Resumed pt care. 57 YOF presents to ED with c/o n/v/d x 2 days with no improvement. Currently denies any n/v/d. NaCl infusing @ 125 ml/hr Dx. KORY. A&Ox4. Denies any other complaints.     LOC: The patient is awake, alert and aware of environment with an appropriate affect, the patient is oriented x 3.  APPEARANCE: Patient resting comfortably and in no acute distress, patient is clean and well groomed, patient's clothing is properly fastened.  SKIN: The skin is warm and dry, patient has normal skin turgor and moist mucus membranes, skin intact, no breakdown or brusing noted.  MUSKULOSKELETAL: Patient moving all extremities well, no obvious swelling or deformities noted.  RESPIRATORY: Airway is open and patent, respirations are spontaneous, patient has a normal effort and rate.  CARDIAC: No peripheral edema.  ABDOMEN: Soft and no tenderness to palpation, no distention noted.     ED workup in progress. VSS. Safety measures in place; side rails up x2. Call light within pt reach. Will continue to monitor.

## 2023-06-06 NOTE — ED TRIAGE NOTES
"Pt reports to ED with c/o N/V/D x 2 days. Pt states "urgent care told me to go to the emergency dept for IV fluids." PMH type 2 DM and HTN. Pt reports taking 20 units of insulin today. Denies CP. AAOx4, NAD noted  "

## 2023-06-06 NOTE — ED NOTES
Pt in in bed, call light in reach, personal items at bedside, bed at lowest position, no acute distress noted, respirations even and unlabored, pt denies pain at this time, pt endorse nausea, will notify ED MD of previously administered nausea meds. Pt instructed to use call light for all needs, pt verbalized understanding, Plan of car ongoing.

## 2023-06-06 NOTE — H&P
ED Observation Unit  History and Physical      I assumed care of this patient from the Main ED at onset of my shift at 11:00 a.m on 06/06/2023.       History of Present Illness:  57-year-old female with history of type 2 diabetes on insulin presents ER for evaluation of nausea, vomiting and diarrhea ongoing for last 2 and half days.  Patient reports multiple episodes over last couple days.  Had 3 episodes of vomiting and diarrhea today.  Denies any abdominal pain but has had some very minimal cramping.  Denies any flank pain or UTI symptoms including dysuria hematuria.  She denies any fever chills green no known sick contacts.  No changes in diet or medication.  No recent antibiotic use.  She denies recent travel.  She did try to take over-the-counter medication but was not able to tolerate the medicine due to vomiting.  Patient does report taking her insulin this morning.  Has not had anything to eat for the last 2 days.  She went to urgent care but was told to come to the ER for IV hydration.  Denies any cough, congestion, URI symptoms.    ED Course:  - afebrile, but mildly tachycardic on arrival   - CBC without leukocytosis, elevated hemoglobin of 18.1  - CMP shows KORY creatinine of 2.6, BUN of 55.  GFR of 21.   - Patient acidotic CO2 of 16, anion gap of 23  - Glucose 59, patient unable to tolerate PO and was given D10 IV  - CT without evidence of acute intra-abdominal abnormalities     I reviewed the ED Provider Note dated 6/6/23 prior to my evaluation of this patient.  I reviewed all labs and imaging performed in the Main ED, prior to patient being placed in Observation. Patient was placed in the ED Observation Unit for KORY, intractable nausea vomiting, metabolic acidosis, and hypoglycemia.    PMHx   Past Medical History:   Diagnosis Date    Abnormal cervical Papanicolaou smear ? 1990    colposcopy    Diabetes mellitus       History reviewed. No pertinent surgical history.     Family Hx   Family History  "  Problem Relation Age of Onset    Colon cancer Neg Hx     Ovarian cancer Neg Hx     Breast cancer Maternal Aunt         Social Hx   Social History     Socioeconomic History    Marital status: Single   Tobacco Use    Smoking status: Some Days     Packs/day: 0.50     Years: 12.00     Pack years: 6.00     Types: Cigarettes    Smokeless tobacco: Never   Substance and Sexual Activity    Alcohol use: No    Drug use: No    Sexual activity: Yes     Partners: Male     Birth control/protection: Post-menopausal        Vital Signs   Vitals:    06/06/23 1502 06/06/23 1534 06/06/23 1608 06/06/23 1611   BP: (!) 197/92  (!) 175/77    BP Location: Left arm  Right arm    Patient Position: Lying  Lying    Pulse: 92  88 92   Resp: 18  16    Temp: 98.7 °F (37.1 °C)  98.8 °F (37.1 °C)    TempSrc: Oral  Oral    SpO2: 100%  99%    Weight:  60.3 kg (132 lb 14.4 oz)     Height:  5' 1" (1.549 m)          Review of Systems  Review of Systems   Constitutional:  Negative for chills and fever.   HENT:  Negative for congestion, nosebleeds and sore throat.    Eyes:  Negative for blurred vision, double vision and photophobia.   Respiratory:  Negative for cough and shortness of breath.    Cardiovascular:  Negative for chest pain, claudication and leg swelling.   Gastrointestinal:  Positive for diarrhea, nausea and vomiting.   Genitourinary:  Negative for dysuria and urgency.   Musculoskeletal:  Negative for back pain and neck pain.   Skin:  Negative for itching and rash.   Neurological:  Negative for dizziness, weakness and headaches.     Physical Exam  Physical Exam  Constitutional:       General: She is not in acute distress.     Appearance: Normal appearance. She is ill-appearing. She is not toxic-appearing.   HENT:      Head: Normocephalic and atraumatic.      Nose: Nose normal.      Mouth/Throat:      Mouth: Mucous membranes are dry.   Cardiovascular:      Rate and Rhythm: Normal rate and regular rhythm.   Pulmonary:      Effort: Pulmonary " effort is normal. No respiratory distress.   Abdominal:      General: Abdomen is flat. There is no distension.   Musculoskeletal:         General: Normal range of motion.      Cervical back: Normal range of motion.   Skin:     General: Skin is warm and dry.   Neurological:      General: No focal deficit present.      Mental Status: She is alert and oriented to person, place, and time. Mental status is at baseline.   Psychiatric:         Mood and Affect: Mood normal.         Behavior: Behavior normal.       Medications:   Scheduled Meds:   [START ON 6/7/2023] aspirin  81 mg Oral Daily    dicyclomine  20 mg Intramuscular TID    insulin detemir U-100  14 Units Subcutaneous QHS    ondansetron  4 mg Intravenous Q8H    [START ON 6/7/2023] pantoprazole  40 mg Intravenous Daily    scopolamine  1 patch Transdermal Q3 Days     Continuous Infusions:   sodium chloride 0.9% 125 mL/hr at 06/06/23 1532     PRN Meds:.acetaminophen, aluminum-magnesium hydroxide-simethicone, dextrose 10%, dextrose 10%, dextrose 10%, dextrose 10%, glucagon (human recombinant), hydrALAZINE, HYDROcodone-acetaminophen, insulin aspart U-100, loperamide, melatonin, promethazine (PHENERGAN) IVPB, sodium chloride 0.9%, sucralfate      Assessment/Plan:  KORY  - continuous IVF, recheck labs in AM  2. Nausea and vomiting  - scheduled anti-emetics  - continuous IVF  3. Metabolic acidosis   - control vomiting with anti-emetics  4. Hypoglycemia  -  POCT glucose checks, replace with dextrose prn

## 2023-06-07 PROBLEM — E11.43 DIABETIC GASTROPARESIS: Status: ACTIVE | Noted: 2023-06-07

## 2023-06-07 PROBLEM — K31.84 DIABETIC GASTROPARESIS: Status: ACTIVE | Noted: 2023-06-07

## 2023-06-07 LAB
ANION GAP SERPL CALC-SCNC: 10 MMOL/L (ref 8–16)
BUN SERPL-MCNC: 8 MG/DL (ref 6–20)
CALCIUM SERPL-MCNC: 8.7 MG/DL (ref 8.7–10.5)
CHLORIDE SERPL-SCNC: 108 MMOL/L (ref 95–110)
CO2 SERPL-SCNC: 24 MMOL/L (ref 23–29)
CREAT SERPL-MCNC: 0.9 MG/DL (ref 0.5–1.4)
ERYTHROCYTE [DISTWIDTH] IN BLOOD BY AUTOMATED COUNT: 13 % (ref 11.5–14.5)
EST. GFR  (NO RACE VARIABLE): >60 ML/MIN/1.73 M^2
GLUCOSE SERPL-MCNC: 59 MG/DL (ref 70–110)
HCT VFR BLD AUTO: 43.4 % (ref 37–48.5)
HGB BLD-MCNC: 14.7 G/DL (ref 12–16)
MCH RBC QN AUTO: 30.4 PG (ref 27–31)
MCHC RBC AUTO-ENTMCNC: 33.9 G/DL (ref 32–36)
MCV RBC AUTO: 90 FL (ref 82–98)
PLATELET # BLD AUTO: 262 K/UL (ref 150–450)
PMV BLD AUTO: 9.8 FL (ref 9.2–12.9)
POCT GLUCOSE: 164 MG/DL (ref 70–110)
POCT GLUCOSE: 169 MG/DL (ref 70–110)
POCT GLUCOSE: 234 MG/DL (ref 70–110)
POCT GLUCOSE: 368 MG/DL (ref 70–110)
POCT GLUCOSE: 394 MG/DL (ref 70–110)
POCT GLUCOSE: 49 MG/DL (ref 70–110)
POTASSIUM SERPL-SCNC: 3 MMOL/L (ref 3.5–5.1)
RBC # BLD AUTO: 4.83 M/UL (ref 4–5.4)
SODIUM SERPL-SCNC: 142 MMOL/L (ref 136–145)
WBC # BLD AUTO: 6.47 K/UL (ref 3.9–12.7)

## 2023-06-07 PROCEDURE — 25000003 PHARM REV CODE 250: Performed by: INTERNAL MEDICINE

## 2023-06-07 PROCEDURE — 63600175 PHARM REV CODE 636 W HCPCS: Performed by: INTERNAL MEDICINE

## 2023-06-07 PROCEDURE — 25000003 PHARM REV CODE 250

## 2023-06-07 PROCEDURE — 85027 COMPLETE CBC AUTOMATED: CPT

## 2023-06-07 PROCEDURE — 99232 SBSQ HOSP IP/OBS MODERATE 35: CPT | Mod: ,,, | Performed by: INTERNAL MEDICINE

## 2023-06-07 PROCEDURE — 99232 PR SUBSEQUENT HOSPITAL CARE,LEVL II: ICD-10-PCS | Mod: ,,, | Performed by: INTERNAL MEDICINE

## 2023-06-07 PROCEDURE — 63600175 PHARM REV CODE 636 W HCPCS

## 2023-06-07 PROCEDURE — 80048 BASIC METABOLIC PNL TOTAL CA: CPT

## 2023-06-07 PROCEDURE — 11000001 HC ACUTE MED/SURG PRIVATE ROOM

## 2023-06-07 PROCEDURE — 36415 COLL VENOUS BLD VENIPUNCTURE: CPT

## 2023-06-07 PROCEDURE — 94761 N-INVAS EAR/PLS OXIMETRY MLT: CPT

## 2023-06-07 RX ORDER — METOCLOPRAMIDE HYDROCHLORIDE 5 MG/ML
10 INJECTION INTRAMUSCULAR; INTRAVENOUS EVERY 6 HOURS
Status: DISPENSED | OUTPATIENT
Start: 2023-06-07 | End: 2023-06-08

## 2023-06-07 RX ORDER — FAMOTIDINE 20 MG/1
20 TABLET, FILM COATED ORAL DAILY
Status: DISCONTINUED | OUTPATIENT
Start: 2023-06-08 | End: 2023-06-08 | Stop reason: HOSPADM

## 2023-06-07 RX ORDER — ATORVASTATIN CALCIUM 20 MG/1
40 TABLET, FILM COATED ORAL DAILY
Status: DISCONTINUED | OUTPATIENT
Start: 2023-06-08 | End: 2023-06-08 | Stop reason: HOSPADM

## 2023-06-07 RX ORDER — FAMOTIDINE 20 MG/1
20 TABLET, FILM COATED ORAL DAILY
Status: DISCONTINUED | OUTPATIENT
Start: 2023-06-07 | End: 2023-06-07

## 2023-06-07 RX ORDER — MAGNESIUM SULFATE HEPTAHYDRATE 40 MG/ML
2 INJECTION, SOLUTION INTRAVENOUS ONCE
Status: COMPLETED | OUTPATIENT
Start: 2023-06-07 | End: 2023-06-07

## 2023-06-07 RX ADMIN — ONDANSETRON 4 MG: 2 INJECTION INTRAMUSCULAR; INTRAVENOUS at 02:06

## 2023-06-07 RX ADMIN — DICYCLOMINE HYDROCHLORIDE 20 MG: 10 CAPSULE ORAL at 08:06

## 2023-06-07 RX ADMIN — METOCLOPRAMIDE 10 MG: 5 INJECTION, SOLUTION INTRAMUSCULAR; INTRAVENOUS at 11:06

## 2023-06-07 RX ADMIN — ONDANSETRON 4 MG: 2 INJECTION INTRAMUSCULAR; INTRAVENOUS at 05:06

## 2023-06-07 RX ADMIN — METOCLOPRAMIDE 10 MG: 5 INJECTION, SOLUTION INTRAMUSCULAR; INTRAVENOUS at 05:06

## 2023-06-07 RX ADMIN — INSULIN ASPART 3 UNITS: 100 INJECTION, SOLUTION INTRAVENOUS; SUBCUTANEOUS at 09:06

## 2023-06-07 RX ADMIN — DICYCLOMINE HYDROCHLORIDE 20 MG: 10 CAPSULE ORAL at 02:06

## 2023-06-07 RX ADMIN — ASPIRIN 81 MG CHEWABLE TABLET 81 MG: 81 TABLET CHEWABLE at 08:06

## 2023-06-07 RX ADMIN — DICYCLOMINE HYDROCHLORIDE 20 MG: 10 CAPSULE ORAL at 09:06

## 2023-06-07 RX ADMIN — DEXTROSE MONOHYDRATE 250 ML: 100 INJECTION, SOLUTION INTRAVENOUS at 05:06

## 2023-06-07 RX ADMIN — INSULIN ASPART 5 UNITS: 100 INJECTION, SOLUTION INTRAVENOUS; SUBCUTANEOUS at 05:06

## 2023-06-07 RX ADMIN — MAGNESIUM SULFATE HEPTAHYDRATE 2 G: 40 INJECTION, SOLUTION INTRAVENOUS at 09:06

## 2023-06-07 RX ADMIN — POTASSIUM BICARBONATE 25 MEQ: 978 TABLET, EFFERVESCENT ORAL at 11:06

## 2023-06-07 RX ADMIN — INSULIN HUMAN 4 UNITS: 100 INJECTION, SOLUTION PARENTERAL at 11:06

## 2023-06-07 RX ADMIN — ONDANSETRON 4 MG: 2 INJECTION INTRAMUSCULAR; INTRAVENOUS at 09:06

## 2023-06-07 RX ADMIN — SODIUM CHLORIDE: 9 INJECTION, SOLUTION INTRAVENOUS at 12:06

## 2023-06-07 RX ADMIN — SODIUM CHLORIDE: 9 INJECTION, SOLUTION INTRAVENOUS at 08:06

## 2023-06-07 RX ADMIN — POTASSIUM BICARBONATE 25 MEQ: 978 TABLET, EFFERVESCENT ORAL at 09:06

## 2023-06-07 NOTE — ASSESSMENT & PLAN NOTE
Dangers of cigarette smoking were reviewed with patient in detail. Patient was Counseled for 3-10 minutes. Nicotine replacement options were discussed. Nicotine replacement was discussed- not prescribed per patient's request     - continue to encourage smoking cessation

## 2023-06-07 NOTE — SUBJECTIVE & OBJECTIVE
Past Medical History:   Diagnosis Date    Abnormal cervical Papanicolaou smear ? 1990    colposcopy    Diabetes mellitus     Hypertension        History reviewed. No pertinent surgical history.    Review of patient's allergies indicates:   Allergen Reactions    Cephalexin Nausea And Vomiting    Pcn [penicillins] Nausea And Vomiting       No current facility-administered medications on file prior to encounter.     Current Outpatient Medications on File Prior to Encounter   Medication Sig    blood-glucose meter,continuous (DEXCOM G6 ) Misc 1 Device by Other route.    blood-glucose transmitter (DEXCOM G6 TRANSMITTER) Arleen 1 Device by Other route.    DEXCOM G6 SENSOR Arleen Apply topically 4 (four) times daily.    insulin glargine 100 units/mL SubQ pen 28 Units every evening.    JARDIANCE 10 mg tablet Take 10 mg by mouth every morning.    aspirin 81 MG Chew aspirin 81 mg chewable tablet   CHEW AND SWALLOW 1 TABLET BY MOUTH DAILY    ergocalciferol (ERGOCALCIFEROL) 50,000 unit Cap Take 50,000 Units by mouth every 30 days.    insulin aspart (NOVOLOG) 100 unit/mL injection Inject 5 Units into the skin with lunch.    lisinopriL (PRINIVIL,ZESTRIL) 20 MG tablet Take 20 mg by mouth once daily.    venlafaxine (EFFEXOR XR) 37.5 MG 24 hr capsule Take 1 capsule (37.5 mg total) by mouth once daily.    [DISCONTINUED] tramadol (ULTRAM) 50 mg tablet Take 50 mg by mouth every 8 (eight) hours as needed.     Family History       Problem Relation (Age of Onset)    Breast cancer Maternal Aunt    Hypertension Sister    No Known Problems Mother, Father          Tobacco Use    Smoking status: Some Days     Packs/day: 0.50     Years: 12.00     Pack years: 6.00     Types: Cigarettes    Smokeless tobacco: Never   Substance and Sexual Activity    Alcohol use: No    Drug use: No    Sexual activity: Yes     Partners: Male     Birth control/protection: Post-menopausal     Review of Systems   Constitutional:  Positive for appetite change.  Negative for chills, fatigue and fever.   HENT:  Negative for congestion and sore throat.    Eyes:  Negative for pain and redness.   Respiratory:  Negative for cough and shortness of breath.    Cardiovascular:  Negative for chest pain and leg swelling.   Gastrointestinal:  Positive for abdominal pain, diarrhea, nausea and vomiting. Negative for blood in stool and constipation.   Genitourinary:  Negative for difficulty urinating and dysuria.   Musculoskeletal:  Negative for gait problem and joint swelling.   Skin:  Negative for rash and wound.   Neurological:  Negative for light-headedness and headaches.   Psychiatric/Behavioral:  Negative for agitation and behavioral problems.    Objective:     Vital Signs (Most Recent):  Temp: 98.8 °F (37.1 °C) (06/06/23 1608)  Pulse: 93 (06/06/23 1842)  Resp: 16 (06/06/23 1608)  BP: (!) 175/77 (06/06/23 1608)  SpO2: 99 % (06/06/23 1608) Vital Signs (24h Range):  Temp:  [98.1 °F (36.7 °C)-98.8 °F (37.1 °C)] 98.8 °F (37.1 °C)  Pulse:  [] 93  Resp:  [14-20] 16  SpO2:  [96 %-100 %] 99 %  BP: (132-199)/(64-95) 175/77     Weight: 60.3 kg (132 lb 14.4 oz)  Body mass index is 25.11 kg/m².     Physical Exam  Vitals and nursing note reviewed.   Constitutional:       General: She is in acute distress (retching).      Appearance: She is ill-appearing.   HENT:      Head: Normocephalic and atraumatic.      Nose: No congestion or rhinorrhea.   Eyes:      General: No scleral icterus.        Right eye: No discharge.         Left eye: No discharge.   Cardiovascular:      Rate and Rhythm: Normal rate and regular rhythm.      Pulses: Normal pulses.   Pulmonary:      Effort: Pulmonary effort is normal. No respiratory distress.      Breath sounds: Normal breath sounds. No wheezing.   Abdominal:      General: Bowel sounds are normal. There is no distension.      Palpations: Abdomen is soft.      Tenderness: There is no abdominal tenderness.   Musculoskeletal:      Right lower leg: No edema.       Left lower leg: No edema.   Skin:     General: Skin is warm and dry.   Neurological:      Mental Status: She is alert and oriented to person, place, and time. Mental status is at baseline.   Psychiatric:         Mood and Affect: Mood normal.         Behavior: Behavior normal.              Significant Labs: All pertinent labs within the past 24 hours have been reviewed.  BMP:   Recent Labs   Lab 06/06/23  0325   GLU 96  100     145   K 3.9  4.0   *  111*   CO2 21*  21*   BUN 39*  39*   CREATININE 1.6*  1.6*   CALCIUM 8.9  9.1     CBC:   Recent Labs   Lab 06/05/23 2011 06/05/23 2220 06/06/23  0325   WBC 11.40  --  10.25   HGB 18.1*  --  14.6   HCT 53.8* 43 42.6     --  295     CMP:   Recent Labs   Lab 06/05/23 2027 06/06/23  0325    144  145   K 4.0 3.9  4.0    111*  111*   CO2 16* 21*  21*   GLU 59* 96  100   BUN 55* 39*  39*   CREATININE 2.6* 1.6*  1.6*   CALCIUM 10.4 8.9  9.1   PROT 9.0* 7.3   ALBUMIN 4.8 3.9   BILITOT 0.5 0.5   ALKPHOS 142* 116   AST 21 20   ALT 22 19   ANIONGAP 23* 12  13     Lactic Acid: No results for input(s): LACTATE in the last 48 hours.  Magnesium: No results for input(s): MG in the last 48 hours.  POCT Glucose:   Recent Labs   Lab 06/06/23  1159 06/06/23  1605 06/06/23  1802   POCTGLUCOSE 88 61* 128*     Urine Culture: No results for input(s): LABURIN in the last 48 hours.  Urine Studies:   Recent Labs   Lab 06/05/23 2222   COLORU Yellow   APPEARANCEUA Hazy*   PHUR 6.0   SPECGRAV 1.020   PROTEINUA 1+*   GLUCUA 4+*   KETONESU 2+*   BILIRUBINUA Negative   OCCULTUA Negative   NITRITE Negative   UROBILINOGEN Negative   LEUKOCYTESUR 1+*   RBCUA 2   WBCUA 4   BACTERIA Few*   SQUAMEPITHEL 37   HYALINECASTS 37*       Significant Imaging: I have reviewed and interpreted all pertinent imaging results/findings within the past 24 hours.

## 2023-06-07 NOTE — ASSESSMENT & PLAN NOTE
Suspect her nausea and vomiting is a consequence of diabetic gastroparesis as she has been very poorly controlled T2DM for quite some time. Going back to 2008 most A1c 10-11 and lowest is 9. Discussed need for better glucose control and continued consequences of continued poor control including worsening gut function, neuropathy, retinopathy, atherosclerotic consequences.    Trial scheduled reglan today, cautions PO intake and increase as tolerated

## 2023-06-07 NOTE — SUBJECTIVE & OBJECTIVE
Interval History: still unable to really keep anything down, has taken some small sips of water but otherwise no oral intake 2/2 nausea, no pain associated with vomiting. Not associated with movement but is related to oral intake. Last vomiting and diarrhea yesterday, feels worn out.    Review of Systems   Constitutional:  Positive for appetite change. Negative for chills and fever.   Respiratory:  Negative for shortness of breath.    Cardiovascular:  Negative for chest pain.   Gastrointestinal:  Positive for nausea. Negative for abdominal pain, diarrhea and vomiting.   Neurological:  Negative for weakness and light-headedness.   Objective:     Vital Signs (Most Recent):  Temp: 98.7 °F (37.1 °C) (06/07/23 1139)  Pulse: 89 (06/07/23 1139)  Resp: 20 (06/07/23 1139)  BP: (!) 140/76 (06/07/23 1139)  SpO2: 99 % (06/07/23 1139) Vital Signs (24h Range):  Temp:  [98.5 °F (36.9 °C)-99.6 °F (37.6 °C)] 98.7 °F (37.1 °C)  Pulse:  [] 89  Resp:  [16-20] 20  SpO2:  [95 %-100 %] 99 %  BP: (140-204)/(76-92) 140/76     Weight: 60.3 kg (132 lb 14.4 oz)  Body mass index is 25.11 kg/m².  No intake or output data in the 24 hours ending 06/07/23 1222      Physical Exam  Vitals reviewed.   Constitutional:       General: She is not in acute distress.     Appearance: She is ill-appearing.   HENT:      Mouth/Throat:      Comments: Mild erythema of the oropharynx  Cardiovascular:      Rate and Rhythm: Normal rate and regular rhythm.      Pulses: Normal pulses.   Pulmonary:      Effort: Pulmonary effort is normal.      Breath sounds: Normal breath sounds.   Abdominal:      General: There is no distension.      Palpations: Abdomen is soft.      Tenderness: There is no abdominal tenderness.      Comments: Hypoactive bowel sounds   Musculoskeletal:      Right lower leg: No edema.      Left lower leg: No edema.   Skin:     General: Skin is warm and dry.   Neurological:      Mental Status: She is alert and oriented to person, place, and time.  Mental status is at baseline.           Significant Labs: All pertinent labs within the past 24 hours have been reviewed.    Significant Imaging: I have reviewed all pertinent imaging results/findings within the past 24 hours.

## 2023-06-07 NOTE — PROGRESS NOTES
[facility] ([facility]  Progress Note    Patient Name: Lindsay Lou  MRN: [id number]  Patient Class: IP- Inpatient   Admission Date: 6/5/2023  Length of Stay: 1 days  Attending Physician: Venkatesh Colmenares MD  Primary Care Provider: Primary Doctor No        Subjective:     Principal Problem:Nausea vomiting and diarrhea        HPI:  Lindsay Lou is a 57 year old lady with hx of HTN, insulin-dependent DM, Vit D deficiency, 6 pack year smoking history current smoker, presenting with 2.5 days of nausea, vomiting and diarrhea. Patient reports no blood in emesis or stool. Patient denies sick contacts. Patient also reports crampy epigastric abdominal pain. Patient denies fever, chills, sore throat, dysuria, SOB or chest pain.     Afebrile, tachycardic, /95, Pox 100% on RA. Labs on arrival with no leukocytosis. H/H 18.1/53.8, Cr 2.6/BUN 55, AG 23, Bicarb 16. . BHB 1.3. UA with ketonuria. +1 leukocytes, no nitrites, 4 WBC. VBG with pH 7.3, Bicarb 22.5. CT Abdomen Pelvis without contrast with no acute intra-abdominal abnormalities identified. Cholelithiasis. Two separate IUDs versus IUD fragments seen within the uterus. In ED patient was given, 2 x 1L NaCl 0.9%, IV Ondansetron 4mg x 2, IV Metoclopramide 10mg, IV promethazine 25mg x 1. Patient also had a few episodes of hypoglycemia. H/H 14.6/42.6 after IV hydration. Cr improved to 1.6, BUN 39, which is still higher than baseline BUN 19/0.83 (12/22/22). AG closed, from 23 to 12, ALP normalized to 116. Patient continues to dry retch and unable to tolerate oral fluids, overall looking uncomfortable. Diarrhea has abated.     Patient is admitted to Hospital Medicine for intractable nausea and vomiting with KORY and metabolic acidosis. A consult will be placed to GI.       Overview/Hospital Course:  No notes on file    Interval History: still unable to really keep anything down, has taken some small sips of water but otherwise no oral  intake 2/2 nausea, no pain associated with vomiting. Not associated with movement but is related to oral intake. Last vomiting and diarrhea yesterday, feels worn out.    Review of Systems   Constitutional:  Positive for appetite change. Negative for chills and fever.   Respiratory:  Negative for shortness of breath.    Cardiovascular:  Negative for chest pain.   Gastrointestinal:  Positive for nausea. Negative for abdominal pain, diarrhea and vomiting.   Neurological:  Negative for weakness and light-headedness.   Objective:     Vital Signs (Most Recent):  Temp: 98.7 °F (37.1 °C) (06/07/23 1139)  Pulse: 89 (06/07/23 1139)  Resp: 20 (06/07/23 1139)  BP: (!) 140/76 (06/07/23 1139)  SpO2: 99 % (06/07/23 1139) Vital Signs (24h Range):  Temp:  [98.5 °F (36.9 °C)-99.6 °F (37.6 °C)] 98.7 °F (37.1 °C)  Pulse:  [] 89  Resp:  [16-20] 20  SpO2:  [95 %-100 %] 99 %  BP: (140-204)/(76-92) 140/76     Weight: 60.3 kg (132 lb 14.4 oz)  Body mass index is 25.11 kg/m².  No intake or output data in the 24 hours ending 06/07/23 1222      Physical Exam  Vitals reviewed.   Constitutional:       General: She is not in acute distress.     Appearance: She is ill-appearing.   HENT:      Mouth/Throat:      Comments: Mild erythema of the oropharynx  Cardiovascular:      Rate and Rhythm: Normal rate and regular rhythm.      Pulses: Normal pulses.   Pulmonary:      Effort: Pulmonary effort is normal.      Breath sounds: Normal breath sounds.   Abdominal:      General: There is no distension.      Palpations: Abdomen is soft.      Tenderness: There is no abdominal tenderness.      Comments: Hypoactive bowel sounds   Musculoskeletal:      Right lower leg: No edema.      Left lower leg: No edema.   Skin:     General: Skin is warm and dry.   Neurological:      Mental Status: She is alert and oriented to person, place, and time. Mental status is at baseline.           Significant Labs: All pertinent labs within the past 24 hours have been  "reviewed.    Significant Imaging: I have reviewed all pertinent imaging results/findings within the past 24 hours.      Assessment/Plan:      Diabetic gastroparesis  Suspect her nausea and vomiting is a consequence of diabetic gastroparesis as she has been very poorly controlled T2DM for quite some time. Going back to 2008 most A1c 10-11 and lowest is 9. Discussed need for better glucose control and continued consequences of continued poor control including worsening gut function, neuropathy, retinopathy, atherosclerotic consequences.    Trial scheduled reglan today, cautions PO intake and increase as tolerated    Cigarette nicotine dependence without complication  Dangers of cigarette smoking were reviewed with patient in detail. Patient was Counseled for 3-10 minutes. Nicotine replacement options were discussed. Nicotine replacement was discussed- not prescribed per patient's request     - continue to encourage smoking cessation    Metabolic acidosis, increased anion gap (IAG)    Nausea, vomiting and diarrhea - diarrhea resolved, continues to retch, unable to  tolerate oral fluids   Metabolic acidosis - resolved    KORY - resolved    Plan:  - stop IVF  - Continue anti-emetics, scheduled IV Ondansetron 4mg Q8, Transdermal scopolamine patch, IV promethazine 6.25mg Q6 PRN  - Avoid nephrotoxins, renally dose meds  - Advance diet as tolerated to clear liquids  - appreciate GI    Diarrhea  resolved    Essential hypertension  Chronic. Uncontrolled. Ongoing nausea.   Patient reports not taking prescribed lisinopril as her BP is "well-controlled". However, she does not appear to check her BP, not at least for about a month already. Also appears to have omitted aspirin, lovastatin from her daily med regimen as well. Counseled extensively on compliance to meds and checking BP, amendable to change.     - Hold home dose lisinopril in the setting of KORY  - Continue aspirin 81mg daily, lovastatin 10mg daily qhs  - IV hydralazine " 10mg Q6 PRN for SBP >180 DBP >100  - Educate patient about continuing prescribed medications, daily BP checks, low salt diet when more comfortable     Type 2 diabetes mellitus, with long-term current use of insulin  Home meds:  Insulin Lantus 28U qhs  Insulin novolog 5U at lunch daily  Jardiance 10mg daily    Inpatient:  Stop her levemir and cover with sliding scale while not taking in PO. Continue to monitor with accuchecks.       VTE Risk Mitigation (From admission, onward)         Ordered     IP VTE LOW RISK PATIENT  Once         06/06/23 1923     Place sequential compression device  Until discontinued         06/06/23 1923     Place ACOSTA hose  Until discontinued         06/05/23 2309                Discharge Planning   STIVEN:      Code Status: Full Code   Is the patient medically ready for discharge?:     Reason for patient still in hospital (select all that apply): Treatment                     Venkatesh Colmenares MD  Department of Hospital Medicine   [facility] ([place])

## 2023-06-07 NOTE — ASSESSMENT & PLAN NOTE
Home meds:  Insulin Lantus 28U qhs  Insulin novolog 5U at lunch daily  Jardiance 10mg daily    Inpatient:  Stop her levemir and cover with sliding scale while not taking in PO. Continue to monitor with accuchecks.

## 2023-06-07 NOTE — CONSULTS
.[facility] ([place])  Gastroenterology  Consult Note    Patient Name: Lindsay Lou  MRN: [id number]  Admission Date: 6/5/2023  Hospital Length of Stay: 1 days  Code Status: Full Code   Primary Care Physician: Primary Doctor No  Principal Problem:Nausea vomiting and diarrhea    Inpatient consult to Gastroenterology  Consult performed by: Rafael Hernandez MD  Consult ordered by: Ricky Duggan NP      Subjective:     Chief complaint:  Nausea, vomiting, diarrhea    HPI:  57-year-old woman who presented with 2-3 days of nausea, vomiting, and diarrhea.  The onset was fairly sudden.  No sick contacts.  There are multiple episodes of vomiting and diarrhea throughout the day.  She was found to be dehydrated and acidotic.    Fortunately, she is feeling better this morning.  The diarrhea has stopped.  There is no more vomiting.  The nausea has significantly improved.      Past medical history:  Past Medical History:   Diagnosis Date    Abnormal cervical Papanicolaou smear ? 1990    colposcopy    Diabetes mellitus     Hypertension        Past surgical history:  History reviewed. No pertinent surgical history.    Social history:  Social History     Socioeconomic History    Marital status: Single   Tobacco Use    Smoking status: Some Days     Packs/day: 0.50     Years: 12.00     Pack years: 6.00     Types: Cigarettes    Smokeless tobacco: Never   Substance and Sexual Activity    Alcohol use: No    Drug use: No    Sexual activity: Yes     Partners: Male     Birth control/protection: Post-menopausal       Family history:  Family History   Problem Relation Age of Onset    No Known Problems Mother     No Known Problems Father     Hypertension Sister     Breast cancer Maternal Aunt     Colon cancer Neg Hx     Ovarian cancer Neg Hx        Medications:  Medications Prior to Admission   Medication Sig Dispense Refill Last Dose    blood-glucose meter,continuous (DEXCOM G6 ) Misc 1 Device by Other route.        blood-glucose transmitter (DEXCOM G6 TRANSMITTER) Arleen 1 Device by Other route.       DEXCOM G6 SENSOR Arleen Apply topically 4 (four) times daily.   Past Week    insulin glargine 100 units/mL SubQ pen 28 Units every evening.   6/5/2023    JARDIANCE 10 mg tablet Take 10 mg by mouth every morning.   6/5/2023    aspirin 81 MG Chew aspirin 81 mg chewable tablet   CHEW AND SWALLOW 1 TABLET BY MOUTH DAILY   Unknown    ergocalciferol (ERGOCALCIFEROL) 50,000 unit Cap Take 50,000 Units by mouth every 30 days.       insulin aspart (NOVOLOG) 100 unit/mL injection Inject 5 Units into the skin with lunch.       lisinopriL (PRINIVIL,ZESTRIL) 20 MG tablet Take 20 mg by mouth once daily.   Unknown    venlafaxine (EFFEXOR XR) 37.5 MG 24 hr capsule Take 1 capsule (37.5 mg total) by mouth once daily. 30 capsule 11        Allergies:  Review of patient's allergies indicates:   Allergen Reactions    Cephalexin Nausea And Vomiting    Pcn [penicillins] Nausea And Vomiting       Review of systems:  CONSTITUTIONAL: Negative for fever, chills, weight gain.  Positive for weakness.  HEENT: Negative for hearing or vision changes, nasal congestion, dry mouth, sore throat.  CARDIOVASCULAR: Negative for chest pain or palpitations.  RESPIRATORY: Negative for SOB or cough.  GASTROINTESTINAL: See HPI  GENITOURINARY: Negative for dysuria or hematuria.  MUSCULOSKELETAL: Negative for joint or muscle pain.  SKIN: Negative for rashes/lesions.  NEUROLOGIC: Negative for headaches, numbness/tingling.  ENDOCRINE: Negative for excessive thirst.  HEMATOLOGIC: Negative for abnormal bruising.  Aside from above positives, complete 10 point review of systems negative.    Objective:     Vital Signs (Most Recent):  Temp: 98.5 °F (36.9 °C) (06/07/23 0416)  Pulse: 98 (06/07/23 0600)  Resp: 18 (06/07/23 0416)  BP: (!) 164/79 (06/07/23 0416)  SpO2: 97 % (06/07/23 0416) Vital Signs (24h Range):  Temp:  [98.1 °F (36.7 °C)-99.6 °F (37.6 °C)] 98.5 °F (36.9 °C)  Pulse:   [] 98  Resp:  [16-19] 18  SpO2:  [96 %-100 %] 97 %  BP: (136-204)/(64-92) 164/79     Physical examination:  General: well developed, well nourished, no apparent distress  HENT: NCAT, hearing grossly intact, no palpable or visible thyroid mass  Eyes:  anicteric sclera  LYMH: No cervical or supraclavicular lymphadenopathy   Cardiovascular: Regular rate and rhythm. No murmurs appreciated.  Lungs: Non-labored respirations. Breath sounds equal.   Abdomen: soft, NTND, normoactive BS  Extremities: No C/C/E  Neuro: AA&O x 3, no tremors  Psych: Appropriate mood and affect.   Skin: No jaundice, rashes or lesions  Musculoskeletal: no deformity    Labs:  CBC:   Recent Labs   Lab 06/05/23 2011 06/05/23 2220 06/06/23 0325 06/07/23  0436   WBC 11.40  --  10.25 6.47   HGB 18.1*  --  14.6 14.7   HCT 53.8* 43 42.6 43.4     --  295 262     CMP:   Recent Labs   Lab 06/06/23 0325 06/07/23  0436   GLU 96  100 59*   CALCIUM 8.9  9.1 8.7   ALBUMIN 3.9  --    PROT 7.3  --      145 142   K 3.9  4.0 3.0*   CO2 21*  21* 24   *  111* 108   BUN 39*  39* 8   CREATININE 1.6*  1.6* 0.9   ALKPHOS 116  --    ALT 19  --    AST 20  --    BILITOT 0.5  --        Imaging:    CT Impression:     1. No acute intra-abdominal abnormalities identified.  2. Cholelithiasis.  3. Two separate IUDs versus IUD fragments seen within the uterus.  Correlate with patient clinical history    Assessment:   57-year-old woman who presents with nausea, vomiting, diarrhea presumed from a viral gastroenteritis.  Fortunately, her symptoms have significantly improved and should continue to resolve completely.  No GI workup planned at this point unless symptoms persist.      Plan:   1. Advance diet as tolerated  2. Antiemetics as needed         Thank you for your consult.     Rafael Hernandez MD  Gastroenterology  [facility])

## 2023-06-07 NOTE — PROGRESS NOTES
ED Observation Unit  Progress Note      HPI   57-year-old female with history of type 2 diabetes on insulin presents ER for evaluation of nausea, vomiting and diarrhea ongoing for last 2 and half days.  Patient reports multiple episodes over last couple days.  Had 3 episodes of vomiting and diarrhea today.  Denies any abdominal pain but has had some very minimal cramping.  Denies any flank pain or UTI symptoms including dysuria hematuria.  She denies any fever chills green no known sick contacts.  No changes in diet or medication.  No recent antibiotic use.  She denies recent travel.  She did try to take over-the-counter medication but was not able to tolerate the medicine due to vomiting.  Patient does report taking her insulin this morning.  Has not had anything to eat for the last 2 days.  She went to urgent care but was told to come to the ER for IV hydration.  Denies any cough, congestion, URI symptoms.     ED Course:  - afebrile, but mildly tachycardic on arrival   - CBC without leukocytosis, elevated hemoglobin of 18.1  - CMP shows KORY creatinine of 2.6, BUN of 55.  GFR of 21.   - Patient acidotic CO2 of 16, anion gap of 23  - Glucose 59, patient unable to tolerate PO and was given D10 IV  - CT without evidence of acute intra-abdominal abnormalities      I reviewed the ED Provider Note dated 6/6/23 prior to my evaluation of this patient.  I reviewed all labs and imaging performed in the Main ED, prior to patient being placed in Observation. Patient was placed in the ED Observation Unit for KORY, intractable nausea vomiting, metabolic acidosis, and hypoglycemia.     Interval History   - afebrile overnight, tachycardia improved  - 3 episodes of hypoglycemia overnight, most recent BG 92  - KORY improving, Cr now 1.6, baseline <1.0  - received multiple doses of Zofran and dicyclomine overnight and continues to have nausea with dry heaving  - EKG without Qtc elongation  - Patient with need for upgrade to hospital  medicine    PMHx   Past Medical History:   Diagnosis Date    Abnormal cervical Papanicolaou smear ? 1990    colposcopy    Diabetes mellitus     Hypertension       History reviewed. No pertinent surgical history.     Family Hx   Family History   Problem Relation Age of Onset    No Known Problems Mother     No Known Problems Father     Hypertension Sister     Breast cancer Maternal Aunt     Colon cancer Neg Hx     Ovarian cancer Neg Hx         Social Hx   Social History     Socioeconomic History    Marital status: Single   Tobacco Use    Smoking status: Some Days     Packs/day: 0.50     Years: 12.00     Pack years: 6.00     Types: Cigarettes    Smokeless tobacco: Never   Substance and Sexual Activity    Alcohol use: No    Drug use: No    Sexual activity: Yes     Partners: Male     Birth control/protection: Post-menopausal        Vital Signs   Vitals:    06/06/23 1611 06/06/23 1842 06/06/23 1900 06/06/23 1922   BP:    (!) 204/88   BP Location:    Left arm   Patient Position:    Lying   Pulse: 92 93 90 69   Resp:    17   Temp:    98.5 °F (36.9 °C)   TempSrc:    Oral   SpO2:    99%   Weight:       Height:            Review of Systems  Review of Systems   Constitutional:  Negative for chills and fever.   HENT:  Negative for congestion, nosebleeds and sore throat.    Eyes:  Negative for blurred vision, double vision and photophobia.   Respiratory:  Negative for cough and shortness of breath.    Cardiovascular:  Negative for chest pain, claudication and leg swelling.   Gastrointestinal:  Positive for diarrhea, nausea and vomiting.   Genitourinary:  Negative for dysuria and urgency.   Musculoskeletal:  Negative for back pain and neck pain.   Skin:  Negative for itching and rash.   Neurological:  Negative for dizziness, weakness and headaches.     Brief Physical Exam/Reassessment   Physical Exam  Constitutional:       General: She is not in acute distress.     Appearance: Normal appearance. She is ill-appearing. She is not  toxic-appearing.   HENT:      Head: Normocephalic and atraumatic.      Nose: Nose normal.      Mouth/Throat:      Mouth: Mucous membranes are dry.   Cardiovascular:      Rate and Rhythm: Normal rate and regular rhythm.   Pulmonary:      Effort: Pulmonary effort is normal. No respiratory distress.   Abdominal:      General: Abdomen is flat. There is no distension.   Musculoskeletal:         General: Normal range of motion.      Cervical back: Normal range of motion.   Skin:     General: Skin is warm and dry.   Neurological:      General: No focal deficit present.      Mental Status: She is alert and oriented to person, place, and time. Mental status is at baseline.   Psychiatric:         Mood and Affect: Mood normal.         Behavior: Behavior normal.       Labs/Imaging   Labs Reviewed   CBC W/ AUTO DIFFERENTIAL - Abnormal; Notable for the following components:       Result Value    RBC 6.00 (*)     Hemoglobin 18.1 (*)     Hematocrit 53.8 (*)     Gran # (ANC) 9.0 (*)     Immature Grans (Abs) 0.06 (*)     Gran % 78.5 (*)     Lymph % 16.1 (*)     All other components within normal limits   URINALYSIS, REFLEX TO URINE CULTURE - Abnormal; Notable for the following components:    Appearance, UA Hazy (*)     Protein, UA 1+ (*)     Glucose, UA 4+ (*)     Ketones, UA 2+ (*)     Leukocytes, UA 1+ (*)     All other components within normal limits    Narrative:     Specimen Source->Urine   COMPREHENSIVE METABOLIC PANEL - Abnormal; Notable for the following components:    CO2 16 (*)     Glucose 59 (*)     BUN 55 (*)     Creatinine 2.6 (*)     Total Protein 9.0 (*)     Alkaline Phosphatase 142 (*)     Anion Gap 23 (*)     eGFR 21 (*)     All other components within normal limits   URINALYSIS MICROSCOPIC - Abnormal; Notable for the following components:    Bacteria Few (*)     Hyaline Casts, UA 37 (*)     All other components within normal limits    Narrative:     Specimen Source->Urine   CBC W/ AUTO DIFFERENTIAL - Abnormal;  Notable for the following components:    Gran # (ANC) 7.9 (*)     Immature Grans (Abs) 0.05 (*)     Gran % 77.1 (*)     Lymph % 17.3 (*)     All other components within normal limits   COMPREHENSIVE METABOLIC PANEL - Abnormal; Notable for the following components:    Chloride 111 (*)     CO2 21 (*)     BUN 39 (*)     Creatinine 1.6 (*)     eGFR 37 (*)     All other components within normal limits   BASIC METABOLIC PANEL - Abnormal; Notable for the following components:    Chloride 111 (*)     CO2 21 (*)     BUN 39 (*)     Creatinine 1.6 (*)     eGFR 37 (*)     All other components within normal limits   BETA - HYDROXYBUTYRATE, SERUM - Abnormal; Notable for the following components:    Beta-Hydroxybutyrate 1.3 (*)     All other components within normal limits   POCT GLUCOSE - Abnormal; Notable for the following components:    POCT Glucose 60 (*)     All other components within normal limits   POCT GLUCOSE - Abnormal; Notable for the following components:    POCT Glucose 59 (*)     All other components within normal limits   POCT GLUCOSE - Abnormal; Notable for the following components:    POCT Glucose 112 (*)     All other components within normal limits   ISTAT PROCEDURE - Abnormal; Notable for the following components:    POC PH 7.303 (*)     POC PCO2 45.4 (*)     POC PO2 35 (*)     POC HCO3 22.5 (*)     POC SATURATED O2 60 (*)     All other components within normal limits   POCT GLUCOSE - Abnormal; Notable for the following components:    POCT Glucose 59 (*)     All other components within normal limits   POCT GLUCOSE - Abnormal; Notable for the following components:    POCT Glucose 113 (*)     All other components within normal limits   POCT GLUCOSE - Abnormal; Notable for the following components:    POCT Glucose 68 (*)     All other components within normal limits   POCT GLUCOSE - Abnormal; Notable for the following components:    POCT Glucose 62 (*)     All other components within normal limits   POCT GLUCOSE  - Abnormal; Notable for the following components:    POCT Glucose 43 (*)     All other components within normal limits   POCT GLUCOSE - Abnormal; Notable for the following components:    POCT Glucose 169 (*)     All other components within normal limits   LIPASE   POCT GLUCOSE   ISTAT LACTATE   POCT GLUCOSE   POCT GLUCOSE   POCT GLUCOSE MONITORING CONTINUOUS   POCT GLUCOSE MONITORING CONTINUOUS   POCT GLUCOSE MONITORING CONTINUOUS      Imaging Results              CT Abdomen Pelvis  Without Contrast (Final result)  Result time 06/05/23 21:48:59   Procedure changed from CT Abdomen Pelvis With Contrast     Final result by Candy Whitfield MD (06/05/23 21:48:59)                   Impression:      1. No acute intra-abdominal abnormalities identified.  2. Cholelithiasis.  3. Two separate IUDs versus IUD fragments seen within the uterus.  Correlate with patient clinical history.      Electronically signed by: Candy Whitfield MD  Date:    06/05/2023  Time:    21:48               Narrative:    EXAMINATION:  CT ABDOMEN PELVIS WITHOUT CONTRAST    CLINICAL HISTORY:  LLQ abdominal pain;Nausea/vomiting;    TECHNIQUE:  Low dose axial images, sagittal and coronal reformations were obtained from the lung bases to the pubic symphysis.  Oral contrast was not administered.    COMPARISON:  None    FINDINGS:  The visualized portion of the heart is unremarkable.  The lung bases are clear.    Small nonspecific parenchymal calcifications are seen within the inferior aspect of the right hepatic lobe.  Otherwise no significant hepatic abnormality seen on this noncontrast exam.  There is no intra-or extrahepatic biliary ductal dilatation.  There is cholelithiasis.  The stomach, pancreas, spleen, and adrenal glands are unremarkable.    Kidneys show no evidence of stones or hydronephrosis. Ureters are unable be tracked.  Urinary bladder is unremarkable.  Two IUDs versus separate IUD fragments are seen within the uterus.    Appendix is not  definitely visualized, however no abnormalities or inflammatory changes are seen in the region.  The visualized loops of small and large bowel show no evidence of obstruction or inflammation.  No free air or free fluid.    Aorta tapers normally.    No acute osseous abnormality identified. Small fat containing umbilical hernia is noted.                                       I reviewed all labs, imaging, EKGs.     Plan   KORY  - improving. Continue IVF and re-check labs  - will need to upgrade to hospital medicine for further IVF until resolution  2. Nausea and vomiting  - unresolved with zofran and dicyclomine. Started on reglan with some improvement. Continue dosing reglan  - advance diet as tolerated  3. Metabolic acidosis   - resolved, anion gap normal  4. Hypoglycemia  - started on continuous LR with D5   - regular POCT glucose checks        I have discussed this case with hospital medicine Dr. Clomenares who has agreed to accept this patient under his service.

## 2023-06-07 NOTE — PLAN OF CARE
Sw met with patient at bedside.  Patient is alert and oriented with no communication barriers.  Patient verified address is correct on face sheet.  Patient pharmacy of choice is [facility].  Patient denies any DME use.  Patient family will help transport at discharge.   06/07/23 2096   Discharge Assessment   Assessment Type Discharge Planning Assessment   Confirmed/corrected address, phone number and insurance Yes   Confirmed Demographics Correct on Facesheet   Source of Information patient   People in Home alone   Do you expect to return to your current living situation? Yes   Do you have help at home or someone to help you manage your care at home? No   Prior to hospitilization cognitive status: Alert/Oriented   Current cognitive status: Alert/Oriented   Equipment Currently Used at Home none   Readmission within 30 days? No   Patient currently being followed by outpatient case management? No   Do you currently have service(s) that help you manage your care at home? No   Do you take prescription medications? Yes   Do you have prescription coverage? Yes   Do you have any problems affording any of your prescribed medications? No   Is the patient taking medications as prescribed? yes   Who is going to help you get home at discharge? Family   How do you get to doctors appointments? car, drives self;family or friend will provide   Are you on dialysis? No   Do you take coumadin? No   Discharge Plan A Home   Physical Activity   On average, how many days per week do you engage in moderate to strenuous exercise (like a brisk walk)? 0 days   On average, how many minutes do you engage in exercise at this level? 0 min   Financial Resource Strain   How hard is it for you to pay for the very basics like food, housing, medical care, and heating? Not hard   Housing Stability   In the last 12 months, was there a time when you were not able to pay the mortgage or rent on time? N   In the last 12 months, was there a time when  you did not have a steady place to sleep or slept in a shelter (including now)? N   Transportation Needs   In the past 12 months, has lack of transportation kept you from medical appointments or from getting medications? no   In the past 12 months, has lack of transportation kept you from meetings, work, or from getting things needed for daily living? No   Food Insecurity   Within the past 12 months, you worried that your food would run out before you got the money to buy more. Never true   Within the past 12 months, the food you bought just didn't last and you didn't have money to get more. Never true   Stress   Do you feel stress - tense, restless, nervous, or anxious, or unable to sleep at night because your mind is troubled all the time - these days? Not at all   Social Connections   In a typical week, how many times do you talk on the phone with family, friends, or neighbors? More than 3   How often do you get together with friends or relatives? Once   How often do you attend Hindu or Rastafarian services? More than 4   Do you belong to any clubs or organizations such as Hindu groups, unions, fraternal or athletic groups, or school groups? No   How often do you attend meetings of the clubs or organizations you belong to? Never   Are you , , , , never , or living with a partner? Never marrie   Alcohol Use   Q1: How often do you have a drink containing alcohol? Never   Q2: How many drinks containing alcohol do you have on a typical day when you are drinking? None   Q3: How often do you have six or more drinks on one occasion? Never

## 2023-06-07 NOTE — H&P
[facility] Medicine  History & Physical    Patient Name: Lindsay Lou  MRN: [id number]  Patient Class: IP- Inpatient  Admission Date: 6/5/2023  Attending Physician: Venkatesh Colmenares MD   Primary Care Provider: Primary Doctor No      Patient information was obtained from patient, past medical records and ER records.     Subjective:     Principal Problem:Nausea vomiting and diarrhea    Chief Complaint:   Chief Complaint   Patient presents with    Vomiting     N/v/d x 2 days. Reports being seen at urgent care and sent to get fluids.         HPI: Lindsay oLu is a 57 year old lady with hx of HTN, insulin-dependent DM, Vit D deficiency, 6 pack year smoking history current smoker, presenting with 2.5 days of nausea, vomiting and diarrhea. Patient reports no blood in emesis or stool. Patient denies sick contacts. Patient also reports crampy epigastric abdominal pain. Patient denies fever, chills, sore throat, dysuria, SOB or chest pain.     Afebrile, tachycardic, /95, Pox 100% on RA. Labs on arrival with no leukocytosis. H/H 18.1/53.8, Cr 2.6/BUN 55, AG 23, Bicarb 16. . BHB 1.3. UA with ketonuria. +1 leukocytes, no nitrites, 4 WBC. VBG with pH 7.3, Bicarb 22.5. CT Abdomen Pelvis without contrast with no acute intra-abdominal abnormalities identified. Cholelithiasis. Two separate IUDs versus IUD fragments seen within the uterus. In ED patient was given, 2 x 1L NaCl 0.9%, IV Ondansetron 4mg x 2, IV Metoclopramide 10mg, IV promethazine 25mg x 1. Patient also had a few episodes of hypoglycemia. H/H 14.6/42.6 after IV hydration. Cr improved to 1.6, BUN 39, which is still higher than baseline BUN 19/0.83 (12/22/22). AG closed, from 23 to 12, ALP normalized to 116. Patient continues to dry retch and unable to tolerate oral fluids, overall looking uncomfortable. Diarrhea has abated.     Patient is admitted to Hospital Medicine for intractable nausea and vomiting with KORY and  metabolic acidosis. A consult will be placed to GI.       Past Medical History:   Diagnosis Date    Abnormal cervical Papanicolaou smear ? 1990    colposcopy    Diabetes mellitus     Hypertension        History reviewed. No pertinent surgical history.    Review of patient's allergies indicates:   Allergen Reactions    Cephalexin Nausea And Vomiting    Pcn [penicillins] Nausea And Vomiting       No current facility-administered medications on file prior to encounter.     Current Outpatient Medications on File Prior to Encounter   Medication Sig    blood-glucose meter,continuous (DEXCOM G6 ) Misc 1 Device by Other route.    blood-glucose transmitter (DEXCOM G6 TRANSMITTER) Arleen 1 Device by Other route.    DEXCOM G6 SENSOR Arleen Apply topically 4 (four) times daily.    insulin glargine 100 units/mL SubQ pen 28 Units every evening.    JARDIANCE 10 mg tablet Take 10 mg by mouth every morning.    aspirin 81 MG Chew aspirin 81 mg chewable tablet   CHEW AND SWALLOW 1 TABLET BY MOUTH DAILY    ergocalciferol (ERGOCALCIFEROL) 50,000 unit Cap Take 50,000 Units by mouth every 30 days.    insulin aspart (NOVOLOG) 100 unit/mL injection Inject 5 Units into the skin with lunch.    lisinopriL (PRINIVIL,ZESTRIL) 20 MG tablet Take 20 mg by mouth once daily.    venlafaxine (EFFEXOR XR) 37.5 MG 24 hr capsule Take 1 capsule (37.5 mg total) by mouth once daily.    [DISCONTINUED] tramadol (ULTRAM) 50 mg tablet Take 50 mg by mouth every 8 (eight) hours as needed.     Family History       Problem Relation (Age of Onset)    Breast cancer Maternal Aunt    Hypertension Sister    No Known Problems Mother, Father          Tobacco Use    Smoking status: Some Days     Packs/day: 0.50     Years: 12.00     Pack years: 6.00     Types: Cigarettes    Smokeless tobacco: Never   Substance and Sexual Activity    Alcohol use: No    Drug use: No    Sexual activity: Yes     Partners: Male     Birth control/protection:  Post-menopausal     Review of Systems   Constitutional:  Positive for appetite change. Negative for chills, fatigue and fever.   HENT:  Negative for congestion and sore throat.    Eyes:  Negative for pain and redness.   Respiratory:  Negative for cough and shortness of breath.    Cardiovascular:  Negative for chest pain and leg swelling.   Gastrointestinal:  Positive for abdominal pain, diarrhea, nausea and vomiting. Negative for blood in stool and constipation.   Genitourinary:  Negative for difficulty urinating and dysuria.   Musculoskeletal:  Negative for gait problem and joint swelling.   Skin:  Negative for rash and wound.   Neurological:  Negative for light-headedness and headaches.   Psychiatric/Behavioral:  Negative for agitation and behavioral problems.    Objective:     Vital Signs (Most Recent):  Temp: 98.8 °F (37.1 °C) (06/06/23 1608)  Pulse: 93 (06/06/23 1842)  Resp: 16 (06/06/23 1608)  BP: (!) 175/77 (06/06/23 1608)  SpO2: 99 % (06/06/23 1608) Vital Signs (24h Range):  Temp:  [98.1 °F (36.7 °C)-98.8 °F (37.1 °C)] 98.8 °F (37.1 °C)  Pulse:  [] 93  Resp:  [14-20] 16  SpO2:  [96 %-100 %] 99 %  BP: (132-199)/(64-95) 175/77     Weight: 60.3 kg (132 lb 14.4 oz)  Body mass index is 25.11 kg/m².     Physical Exam  Vitals and nursing note reviewed.   Constitutional:       General: She is in acute distress (retching).      Appearance: She is ill-appearing.   HENT:      Head: Normocephalic and atraumatic.      Nose: No congestion or rhinorrhea.   Eyes:      General: No scleral icterus.        Right eye: No discharge.         Left eye: No discharge.   Cardiovascular:      Rate and Rhythm: Normal rate and regular rhythm.      Pulses: Normal pulses.   Pulmonary:      Effort: Pulmonary effort is normal. No respiratory distress.      Breath sounds: Normal breath sounds. No wheezing.   Abdominal:      General: Bowel sounds are normal. There is no distension.      Palpations: Abdomen is soft.      Tenderness:  There is no abdominal tenderness.   Musculoskeletal:      Right lower leg: No edema.      Left lower leg: No edema.   Skin:     General: Skin is warm and dry.   Neurological:      Mental Status: She is alert and oriented to person, place, and time. Mental status is at baseline.   Psychiatric:         Mood and Affect: Mood normal.         Behavior: Behavior normal.              Significant Labs: All pertinent labs within the past 24 hours have been reviewed.  BMP:   Recent Labs   Lab 06/06/23  0325   GLU 96  100     145   K 3.9  4.0   *  111*   CO2 21*  21*   BUN 39*  39*   CREATININE 1.6*  1.6*   CALCIUM 8.9  9.1     CBC:   Recent Labs   Lab 06/05/23 2011 06/05/23 2220 06/06/23  0325   WBC 11.40  --  10.25   HGB 18.1*  --  14.6   HCT 53.8* 43 42.6     --  295     CMP:   Recent Labs   Lab 06/05/23 2027 06/06/23  0325    144  145   K 4.0 3.9  4.0    111*  111*   CO2 16* 21*  21*   GLU 59* 96  100   BUN 55* 39*  39*   CREATININE 2.6* 1.6*  1.6*   CALCIUM 10.4 8.9  9.1   PROT 9.0* 7.3   ALBUMIN 4.8 3.9   BILITOT 0.5 0.5   ALKPHOS 142* 116   AST 21 20   ALT 22 19   ANIONGAP 23* 12  13     Lactic Acid: No results for input(s): LACTATE in the last 48 hours.  Magnesium: No results for input(s): MG in the last 48 hours.  POCT Glucose:   Recent Labs   Lab 06/06/23  1159 06/06/23  1605 06/06/23  1802   POCTGLUCOSE 88 61* 128*     Urine Culture: No results for input(s): LABURIN in the last 48 hours.  Urine Studies:   Recent Labs   Lab 06/05/23 2222   COLORU Yellow   APPEARANCEUA Hazy*   PHUR 6.0   SPECGRAV 1.020   PROTEINUA 1+*   GLUCUA 4+*   KETONESU 2+*   BILIRUBINUA Negative   OCCULTUA Negative   NITRITE Negative   UROBILINOGEN Negative   LEUKOCYTESUR 1+*   RBCUA 2   WBCUA 4   BACTERIA Few*   SQUAMEPITHEL 37   HYALINECASTS 37*       Significant Imaging: I have reviewed and interpreted all pertinent imaging results/findings within the past 24 hours.    Assessment/Plan:  "    Metabolic acidosis, increased anion gap (IAG)    Nausea, vomiting and diarrhea - diarrhea resolved, continues to retch, unable to  tolerate oral fluids   Metabolic acidosis - improving    KORY - improving  2.5 days of nausea, non-bloody vomiting and diarrhea. Labs on arrival with Cr 2.6/BUN 55, AG 23, Bicarb 16. BHB 1.3. UA with ketonuria. VBG with pH 7.3, Bicarb 22.5. 14.6/42.6 after IV hydration. Cr improved to 1.6, BUN 39, which is still higher than baseline BUN 19/0.83 (12/22/22).     Plan:  - Continue IVF IV NaCl 0.9% 125ml/hr   - IV pantoprazole 40mg daily  - Continue anti-emetics, scheduled IV Ondansetron 4mg Q8, Transdermal scopolamine patch, IV promethazine 6.25mg Q6 PRN  - Avoid nephrotoxins, renally dose meds  - Advance diet as tolerated to clear liquids  - Consult GI in AM    Cigarette nicotine dependence without complication  Dangers of cigarette smoking were reviewed with patient in detail. Patient was Counseled for 3-10 minutes. Nicotine replacement options were discussed. Nicotine replacement was discussed- not prescribed per patient's request     - continue to encourage smoking cessation    Essential hypertension  Chronic. Uncontrolled. Ongoing nausea.   Patient reports not taking prescribed lisinopril as her BP is "well-controlled". However, she does not appear to check her BP, not at least for about a month already. Also appears to have omitted aspirin, lovastatin from her daily med regimen as well. Counseled extensively on compliance to meds and checking BP, amendable to change.     - Hold home dose lisinopril in the setting of KORY  - Continue aspirin 81mg daily, lovastatin 10mg daily qhs  - IV hydralazine 10mg Q6 PRN for SBP >180 DBP >100  - Educate patient about continuing prescribed medications, daily BP checks, low salt diet when more comfortable     Type 2 diabetes mellitus, with long-term current use of insulin  Patient's FSGs are uncontrolled due to hypoglycemia on current medication " regimen likely due to impaired oral intake for the last 2-3 days.     Last A1c reviewed-   Lab Results   Component Value Date    HGBA1C 11.8 (H) 12/22/2022     Most recent fingerstick glucose reviewed-   Recent Labs   Lab 06/06/23  0759 06/06/23  0854 06/06/23  1101 06/06/23  1159   POCTGLUCOSE 43* 169* 104 88     Current correctional scale  Low  Maintain anti-hyperglycemic dose as follows-   Antihyperglycemics (From admission, onward)    Start     Stop Route Frequency Ordered    06/06/23 2100  insulin detemir U-100 (Levemir) pen 14 Units         -- SubQ Nightly 06/06/23 1511    06/06/23 1645  insulin aspart U-100 pen 0-5 Units         -- SubQ Every 4 hours PRN 06/06/23 1546        Home meds:  Insulin Lantus 28U qhs  Insulin novolog 5U at lunch daily  Jardiance 10mg daily    Inpatient:  Decreased detemir to 14U qhs in the setting of hypolgycemia  POCT BG Q4 hrs for now until able to take orally, with correctional insulin aspart PRN  Hold Jardiance       VTE Risk Mitigation (From admission, onward)         Ordered     IP VTE LOW RISK PATIENT  Once         06/06/23 1923     Place sequential compression device  Until discontinued         06/06/23 1923     Place ACOSTA hose  Until discontinued         06/05/23 6733                Ricky Duggan NP  Department of Hospital Medicine  [facility] - Med Surg ([place])

## 2023-06-07 NOTE — ASSESSMENT & PLAN NOTE
Nausea, vomiting and diarrhea - diarrhea resolved, continues to retch, unable to  tolerate oral fluids   Metabolic acidosis - resolved    KORY - resolved    Plan:  - stop IVF  - Continue anti-emetics, scheduled IV Ondansetron 4mg Q8, Transdermal scopolamine patch, IV promethazine 6.25mg Q6 PRN  - Avoid nephrotoxins, renally dose meds  - Advance diet as tolerated to clear liquids  - appreciate GI

## 2023-06-07 NOTE — PLAN OF CARE
Problem: Adult Inpatient Plan of Care  Goal: Plan of Care Review  Outcome: Ongoing, Progressing  Goal: Patient-Specific Goal (Individualized)  Outcome: Ongoing, Progressing  Goal: Absence of Hospital-Acquired Illness or Injury  Outcome: Ongoing, Progressing  Goal: Optimal Comfort and Wellbeing  Outcome: Ongoing, Progressing  Goal: Readiness for Transition of Care  Outcome: Ongoing, Progressing     Problem: Diabetes Comorbidity  Goal: Blood Glucose Level Within Targeted Range  Outcome: Ongoing, Progressing     Problem: Fluid and Electrolyte Imbalance (Acute Kidney Injury/Impairment)  Goal: Fluid and Electrolyte Balance  Outcome: Ongoing, Progressing    POC reviewed with patient. AAOx4. Stable on room air. No any complaints verbalized during shift. Positions self . Get up to the bathroom independently .Tolerated clear liquid diet however pt has very poor intake. No injuries, falls, or trauma occurred during shift. Purposeful rounding completed. Bed low and locked with side rails up x 3 and call light within reach. Cont to monitor.

## 2023-06-08 VITALS
HEART RATE: 97 BPM | WEIGHT: 132.88 LBS | DIASTOLIC BLOOD PRESSURE: 65 MMHG | BODY MASS INDEX: 25.09 KG/M2 | TEMPERATURE: 99 F | SYSTOLIC BLOOD PRESSURE: 132 MMHG | HEIGHT: 61 IN | RESPIRATION RATE: 16 BRPM | OXYGEN SATURATION: 96 %

## 2023-06-08 PROBLEM — N17.9 AKI (ACUTE KIDNEY INJURY): Status: RESOLVED | Noted: 2023-06-06 | Resolved: 2023-06-08

## 2023-06-08 PROBLEM — E87.29 METABOLIC ACIDOSIS, INCREASED ANION GAP (IAG): Status: RESOLVED | Noted: 2023-06-06 | Resolved: 2023-06-08

## 2023-06-08 PROBLEM — R19.7 NAUSEA VOMITING AND DIARRHEA: Status: RESOLVED | Noted: 2021-05-13 | Resolved: 2023-06-08

## 2023-06-08 PROBLEM — R11.2 NAUSEA VOMITING AND DIARRHEA: Status: RESOLVED | Noted: 2021-05-13 | Resolved: 2023-06-08

## 2023-06-08 PROBLEM — R19.7 DIARRHEA: Status: RESOLVED | Noted: 2023-06-06 | Resolved: 2023-06-08

## 2023-06-08 LAB
ANION GAP SERPL CALC-SCNC: 12 MMOL/L (ref 8–16)
BUN SERPL-MCNC: 13 MG/DL (ref 6–20)
CALCIUM SERPL-MCNC: 9.3 MG/DL (ref 8.7–10.5)
CHLORIDE SERPL-SCNC: 105 MMOL/L (ref 95–110)
CO2 SERPL-SCNC: 25 MMOL/L (ref 23–29)
CREAT SERPL-MCNC: 1 MG/DL (ref 0.5–1.4)
ERYTHROCYTE [DISTWIDTH] IN BLOOD BY AUTOMATED COUNT: 12.8 % (ref 11.5–14.5)
EST. GFR  (NO RACE VARIABLE): >60 ML/MIN/1.73 M^2
ESTIMATED AVG GLUCOSE: 255 MG/DL (ref 68–131)
GLUCOSE SERPL-MCNC: 55 MG/DL (ref 70–110)
HBA1C MFR BLD: 10.5 % (ref 4–5.6)
HCT VFR BLD AUTO: 39.8 % (ref 37–48.5)
HGB BLD-MCNC: 13.3 G/DL (ref 12–16)
MCH RBC QN AUTO: 30.4 PG (ref 27–31)
MCHC RBC AUTO-ENTMCNC: 33.4 G/DL (ref 32–36)
MCV RBC AUTO: 91 FL (ref 82–98)
PLATELET # BLD AUTO: 229 K/UL (ref 150–450)
PMV BLD AUTO: 9.7 FL (ref 9.2–12.9)
POCT GLUCOSE: 221 MG/DL (ref 70–110)
POCT GLUCOSE: 306 MG/DL (ref 70–110)
POCT GLUCOSE: 355 MG/DL (ref 70–110)
POCT GLUCOSE: 399 MG/DL (ref 70–110)
POCT GLUCOSE: 52 MG/DL (ref 70–110)
POTASSIUM SERPL-SCNC: 3.5 MMOL/L (ref 3.5–5.1)
RBC # BLD AUTO: 4.37 M/UL (ref 4–5.4)
SODIUM SERPL-SCNC: 142 MMOL/L (ref 136–145)
WBC # BLD AUTO: 6.74 K/UL (ref 3.9–12.7)

## 2023-06-08 PROCEDURE — 80048 BASIC METABOLIC PNL TOTAL CA: CPT

## 2023-06-08 PROCEDURE — 25000003 PHARM REV CODE 250

## 2023-06-08 PROCEDURE — 25000003 PHARM REV CODE 250: Performed by: INTERNAL MEDICINE

## 2023-06-08 PROCEDURE — 85027 COMPLETE CBC AUTOMATED: CPT

## 2023-06-08 PROCEDURE — 36415 COLL VENOUS BLD VENIPUNCTURE: CPT

## 2023-06-08 PROCEDURE — 99239 HOSP IP/OBS DSCHRG MGMT >30: CPT | Mod: ,,, | Performed by: INTERNAL MEDICINE

## 2023-06-08 PROCEDURE — 94761 N-INVAS EAR/PLS OXIMETRY MLT: CPT

## 2023-06-08 PROCEDURE — 83036 HEMOGLOBIN GLYCOSYLATED A1C: CPT | Performed by: INTERNAL MEDICINE

## 2023-06-08 PROCEDURE — 63600175 PHARM REV CODE 636 W HCPCS

## 2023-06-08 PROCEDURE — 25000003 PHARM REV CODE 250: Performed by: PHYSICIAN ASSISTANT

## 2023-06-08 PROCEDURE — 99239 PR HOSPITAL DISCHARGE DAY,>30 MIN: ICD-10-PCS | Mod: ,,, | Performed by: INTERNAL MEDICINE

## 2023-06-08 RX ORDER — ATORVASTATIN CALCIUM 40 MG/1
40 TABLET, FILM COATED ORAL DAILY
Qty: 90 TABLET | Refills: 3 | Status: SHIPPED | OUTPATIENT
Start: 2023-06-09 | End: 2023-06-08 | Stop reason: SDUPTHER

## 2023-06-08 RX ORDER — ATORVASTATIN CALCIUM 40 MG/1
40 TABLET, FILM COATED ORAL DAILY
Qty: 90 TABLET | Refills: 3 | Status: SHIPPED | OUTPATIENT
Start: 2023-06-09 | End: 2024-06-08

## 2023-06-08 RX ORDER — DICYCLOMINE HYDROCHLORIDE 10 MG/1
20 CAPSULE ORAL 3 TIMES DAILY
Qty: 180 CAPSULE | Refills: 0 | Status: SHIPPED | OUTPATIENT
Start: 2023-06-08 | End: 2023-07-08

## 2023-06-08 RX ORDER — DICYCLOMINE HYDROCHLORIDE 10 MG/1
20 CAPSULE ORAL 3 TIMES DAILY
Qty: 180 CAPSULE | Refills: 0 | Status: SHIPPED | OUTPATIENT
Start: 2023-06-08 | End: 2023-06-08 | Stop reason: SDUPTHER

## 2023-06-08 RX ORDER — ONDANSETRON 4 MG/1
4 TABLET, ORALLY DISINTEGRATING ORAL EVERY 8 HOURS PRN
Qty: 21 TABLET | Refills: 0 | Status: SHIPPED | OUTPATIENT
Start: 2023-06-08 | End: 2023-06-15

## 2023-06-08 RX ADMIN — LOPERAMIDE HYDROCHLORIDE 4 MG: 2 CAPSULE ORAL at 09:06

## 2023-06-08 RX ADMIN — DICYCLOMINE HYDROCHLORIDE 20 MG: 10 CAPSULE ORAL at 09:06

## 2023-06-08 RX ADMIN — INSULIN ASPART 5 UNITS: 100 INJECTION, SOLUTION INTRAVENOUS; SUBCUTANEOUS at 10:06

## 2023-06-08 RX ADMIN — ASPIRIN 81 MG CHEWABLE TABLET 81 MG: 81 TABLET CHEWABLE at 09:06

## 2023-06-08 RX ADMIN — ATORVASTATIN CALCIUM 40 MG: 20 TABLET, FILM COATED ORAL at 08:06

## 2023-06-08 RX ADMIN — INSULIN ASPART 5 UNITS: 100 INJECTION, SOLUTION INTRAVENOUS; SUBCUTANEOUS at 02:06

## 2023-06-08 RX ADMIN — INSULIN ASPART 4 UNITS: 100 INJECTION, SOLUTION INTRAVENOUS; SUBCUTANEOUS at 06:06

## 2023-06-08 RX ADMIN — FAMOTIDINE 20 MG: 20 TABLET, FILM COATED ORAL at 08:06

## 2023-06-08 RX ADMIN — INSULIN DETEMIR 15 UNITS: 100 INJECTION, SOLUTION SUBCUTANEOUS at 12:06

## 2023-06-08 RX ADMIN — ONDANSETRON 4 MG: 2 INJECTION INTRAMUSCULAR; INTRAVENOUS at 06:06

## 2023-06-08 NOTE — PLAN OF CARE
Patient is alert and oriented x 4, able to make needs and wants known. Patient is independent with ADLs. No c/o nausea, vomiting or diarrhea overnight. Patient is on a regular diet, with fair intake observed. Blood glucose monitored q4h, see previous note.Bed is in the lowest position, wheels are locked, call light is within reach. POC reviewed with patient, no acute events to note.  Purposeful rounding completed overnight.

## 2023-06-08 NOTE — NURSING
Discharge instructions provided. Patient verbalized understanding... iv removed...  5 units of novolog was administered for a ..patient received return to work letter and will be leaving shortly.

## 2023-06-08 NOTE — PROGRESS NOTES
"Gastroenterology Progress Note    Reason for Consult: N/V, diarrhea    Subjective:  No vomiting yesterday or today.  Tolerating PO.  Still w/ some diarrhea but slowing down.  Got a dose of imodium this AM, but it was her first one.  Reports phlegm, coughing whenever she eats, but this has been a chronic issue.      ROS:  Gen: no F/C  CV: no CP/palpitations  Resp: no SOB/wheezing    Physical Exam  BP (!) 118/55 (BP Location: Left arm, Patient Position: Lying)   Pulse 93   Temp 99 °F (37.2 °C) (Oral)   Resp 16   Ht 5' 1" (1.549 m)   Wt 60.3 kg (132 lb 14.4 oz)   SpO2 96%   BMI 25.11 kg/m²   General: Awake, alert female in no apparent distress  HEENT: NC/AT, PERRL, EOMI, MMM  CV: RRR, without murmur, gallop, or rubs  Pulm: CTAB, no wheezing, rales, or rhonchi  GI: soft, mild LLQ TTP, non-distended, +BS  MSK: no edema and normal ROM  Neuro: A&Ox3, moves all extremities equally, sensation grossly intact  Skin: no rashes or lesions  Psych: normal mood and affect    Medications/Infusions:  Current Facility-Administered Medications   Medication Dose Route Frequency Provider Last Rate Last Admin    acetaminophen tablet 1,000 mg  1,000 mg Oral Q8H PRN Rezauztam Urban, NP        aluminum-magnesium hydroxide-simethicone 200-200-20 mg/5 mL suspension 30 mL  30 mL Oral Q6H PRN Ricky Duggan, NP        aspirin chewable tablet 81 mg  81 mg Oral Daily Shuzhen Urban, NP   81 mg at 06/07/23 0826    atorvastatin tablet 40 mg  40 mg Oral Daily Venkatesh Colmenares MD        dextrose 10% bolus 125 mL 125 mL  12.5 g Intravenous PRN Rezauzhen Urban, NP        dextrose 10% bolus 250 mL 250 mL  25 g Intravenous PRN Rezauztam Urban, NP   Stopped at 06/07/23 0543    dextrose 40 % gel 15,000 mg  15 g Oral PRN Shuzhen Urban, NP        dextrose 40 % gel 30,000 mg  30 g Oral PRN Shuzhen Urban, NP        dicyclomine capsule 20 mg  20 mg Oral TID Rezauzhen Urban, NP   20 mg at 06/07/23 2125    famotidine tablet 20 mg  20 mg Oral Daily Venkatesh Colmenares MD        glucagon " (human recombinant) injection 1 mg  1 mg Intramuscular PRN Sosa Medina PA-C        hydrALAZINE injection 10 mg  10 mg Intravenous Q8H PRN Ricky Duggan NP   10 mg at 06/06/23 1958    HYDROcodone-acetaminophen 5-325 mg per tablet 1 tablet  1 tablet Oral Q4H PRN Sosa Medina PA-C        insulin aspart U-100 pen 0-5 Units  0-5 Units Subcutaneous Q4H PRN Ricky Duggan NP   4 Units at 06/08/23 0610    loperamide capsule 4 mg  4 mg Oral Once PRN Sosa Medina PA-C        melatonin tablet 6 mg  6 mg Oral Nightly PRN Sosa Medina PA-C        metoclopramide HCl injection 10 mg  10 mg Intravenous Q6H Venkatesh Colmenares MD   10 mg at 06/07/23 2338    ondansetron injection 4 mg  4 mg Intravenous Q8H Ricky Duggan NP   4 mg at 06/08/23 0605    promethazine (PHENERGAN) 6.25 mg in dextrose 5 % (D5W) 50 mL IVPB  6.25 mg Intravenous Q6H PRN Ricky Duggan NP   Stopped at 06/06/23 2021    scopolamine 1.3-1.5 mg (1 mg over 3 days) 1 patch  1 patch Transdermal Q3 Days Ricky Duggan NP   1 patch at 06/06/23 1632    sodium chloride 0.9% flush 10 mL  10 mL Intravenous PRN Sosa Medina PA-C        sucralfate 100 mg/mL suspension 1 g  1 g Oral Q8H PRN Ricky Duggan NP           Intake and Output:    Intake/Output Summary (Last 24 hours) at 6/8/2023 0710  Last data filed at 6/7/2023 1636  Gross per 24 hour   Intake 2425.75 ml   Output --   Net 2425.75 ml       Labs:  Lab Results   Component Value Date/Time    WBC 6.74 06/08/2023 05:16 AM    HGB 13.3 06/08/2023 05:16 AM    HCT 39.8 06/08/2023 05:16 AM    HCT 43 06/05/2023 10:20 PM     06/08/2023 05:16 AM    MCV 91 06/08/2023 05:16 AM     06/08/2023 12:43 AM    K 3.5 06/08/2023 12:43 AM     06/08/2023 12:43 AM    CO2 25 06/08/2023 12:43 AM    BUN 13 06/08/2023 12:43 AM    CREATININE 1.0 06/08/2023 12:43 AM    GLU 55 (L) 06/08/2023 12:43 AM    CALCIUM 9.3 06/08/2023 12:43 AM   ]  Lab Results   Component Value Date/Time    PROT 7.3 06/06/2023 03:25 AM     ALBUMIN 3.9 06/06/2023 03:25 AM    BILITOT 0.5 06/06/2023 03:25 AM    AST 20 06/06/2023 03:25 AM    ALT 19 06/06/2023 03:25 AM    ALKPHOS 116 06/06/2023 03:25 AM   ]    Imaging and Procedures:  Labs and imaging results were reviewed.  CT A/P 6/5/23:  1. No acute intra-abdominal abnormalities identified.  2. Cholelithiasis.  3. Two separate IUDs versus IUD fragments seen within the uterus.  Correlate with patient clinical history.    Assessment:  Lindsay Conley Theophile is a 57 y.o. female with a history of HTN and DM admitted w/ N/V, and diarrhea.  Suspect viral gastroenteritis.    Plan:  - supportive care w/ anti-emetics, imodium, dicyclomine  - diet as tolerated  - outpatient GI clinic follow up in the near future - no prior colonoscopy, so needs this for colon cancer screening (as well as biopsies to r/o microscopic colitis if the diarrhea persists)  - can consider outpatient EGD as well to evaluate the phlegm/coughing w/ meals - ? Stricture vs. Motility issue    Ritu Montana MD

## 2023-06-08 NOTE — NURSING
Patient's blood glucose at 2100 once again elevated in the 300's. Patient medicated with sliding scale insulin and hospitalist notified. One time dose of IV regular insulin administered. Blood glucose reassessed and found to be 52. Patient was alert and able to tolerate PO intake. Patient was given snacks and blood glucose was reassessed and increased to 221. Next glucose check will be 0600.

## 2023-06-08 NOTE — PLAN OF CARE
Appt scheduled for patient and placed on AVS.  Patient family will help transport at discharge.   06/08/23 1548   Final Note   Assessment Type Final Discharge Note   Anticipated Discharge Disposition Home   Hospital Resources/Appts/Education Provided Provided education on problems/symptoms using teachback;Appointments scheduled and added to AVS   Post-Acute Status   Discharge Delays None known at this time     Appt

## 2023-06-08 NOTE — DISCHARGE SUMMARY
[facility] Medicine  Discharge Summary      Patient Name: Lindsay Lou  MRN: [id number]  CHIVO: [id number]  Patient Class: IP- Inpatient  Admission Date: 6/5/2023  Hospital Length of Stay: 2 days  Discharge Date and Time:  06/08/2023 2:31 PM  Attending Physician: Venkatesh Colmenares MD   Discharging Provider: Venkatesh Colmenares MD  Primary Care Provider: Primary Doctor No    Primary Care Team: Networked reference to record PCT     HPI:   Lindsay Lou is a 57 year old lady with hx of HTN, insulin-dependent DM, Vit D deficiency, 6 pack year smoking history current smoker, presenting with 2.5 days of nausea, vomiting and diarrhea. Patient reports no blood in emesis or stool. Patient denies sick contacts. Patient also reports crampy epigastric abdominal pain. Patient denies fever, chills, sore throat, dysuria, SOB or chest pain.     Afebrile, tachycardic, /95, Pox 100% on RA. Labs on arrival with no leukocytosis. H/H 18.1/53.8, Cr 2.6/BUN 55, AG 23, Bicarb 16. . BHB 1.3. UA with ketonuria. +1 leukocytes, no nitrites, 4 WBC. VBG with pH 7.3, Bicarb 22.5. CT Abdomen Pelvis without contrast with no acute intra-abdominal abnormalities identified. Cholelithiasis. Two separate IUDs versus IUD fragments seen within the uterus. In ED patient was given, 2 x 1L NaCl 0.9%, IV Ondansetron 4mg x 2, IV Metoclopramide 10mg, IV promethazine 25mg x 1. Patient also had a few episodes of hypoglycemia. H/H 14.6/42.6 after IV hydration. Cr improved to 1.6, BUN 39, which is still higher than baseline BUN 19/0.83 (12/22/22). AG closed, from 23 to 12, ALP normalized to 116. Patient continues to dry retch and unable to tolerate oral fluids, overall looking uncomfortable. Diarrhea has abated.     Patient is admitted to Hospital Medicine for intractable nausea and vomiting with KORY and metabolic acidosis. A consult will be placed to GI.       * No surgery found *      Hospital Course:   Patient was admitted  with IVF and glucose control. She was placed on scheduled reglan and her N/V resolved. GI saw her in concert with no further workup to be done. She has a referral for GI for colonoscopy due to diarrhea for evaluation. Counseled on needing to control her sugars more effectively which starts with diet control and strict adherence to her insulin regimen. To follow up with PCP.       Goals of Care Treatment Preferences:  Code Status: Full Code      Consults:   Consults (From admission, onward)        Status Ordering Provider     Inpatient consult to Gastroenterology  Once        Provider:  Rafael Hernandez MD    Completed SANDRO S[place]IN          No new Assessment & Plan notes have been filed under this hospital service since the last note was generated.  Service: Hospital Medicine    Final Active Diagnoses:    Diagnosis Date Noted POA    Diabetic gastroparesis [E11.43, K31.84] 06/07/2023 Yes    Essential hypertension [I10] 06/06/2023 Yes    Cigarette nicotine dependence without complication [F17.210] 06/06/2023 Yes     Chronic    Type 2 diabetes mellitus, with long-term current use of insulin [E11.9, Z79.4] 05/06/2009 Not Applicable     Chronic      Problems Resolved During this Admission:    Diagnosis Date Noted Date Resolved POA    PRINCIPAL PROBLEM:  Nausea vomiting and diarrhea [R11.2, R19.7] 05/13/2021 06/08/2023 Yes    Diarrhea [R19.7] 06/06/2023 06/08/2023 Yes    Metabolic acidosis, increased anion gap (IAG) [E87.29] 06/06/2023 06/08/2023 Yes    KORY (acute kidney injury) [N17.9] 06/06/2023 06/08/2023 Yes       Discharged Condition: good    Disposition: Home or Self Care    Follow Up:    Patient Instructions:      Ambulatory referral/consult to Gastroenterology   Standing Status: Future   Referral Priority: Routine Referral Type: Consultation   Referral Reason: Specialty Services Required   Requested Specialty: Gastroenterology   Number of Visits Requested: 1     Diet diabetic     Reason for not Ordering  Smoking Cessation Referral     Order Specific Question Answer Comments   Reason for not ordering: Patient refused      Reason for not Prescribing Nicotine Replacement     Order Specific Question Answer Comments   Reason for not Prescribing: Patient refused      Activity as tolerated       Significant Diagnostic Studies: Labs:   BMP:   Recent Labs   Lab 06/07/23 0436 06/08/23  0043   GLU 59* 55*    142   K 3.0* 3.5    105   CO2 24 25   BUN 8 13   CREATININE 0.9 1.0   CALCIUM 8.7 9.3    and CBC   Recent Labs   Lab 06/07/23  0436 06/08/23 0516   WBC 6.47 6.74   HGB 14.7 13.3   HCT 43.4 39.8    229       Pending Diagnostic Studies:     Procedure Component Value Units Date/Time    Hemoglobin A1c [[id number]] Collected: 06/08/23 0516    Order Status: Sent Lab Status: In process Updated: 06/08/23 1320    Specimen: Blood          Medications:  Reconciled Home Medications:      Medication List      START taking these medications    atorvastatin 40 MG tablet  Commonly known as: LIPITOR  Take 1 tablet (40 mg total) by mouth once daily.  Start taking on: June 9, 2023     dicyclomine 10 MG capsule  Commonly known as: BENTYL  Take 2 capsules (20 mg total) by mouth 3 (three) times daily.     ondansetron 4 MG Tbdl  Commonly known as: ZOFRAN-ODT  Take 1 tablet (4 mg total) by mouth every 8 (eight) hours as needed (nausea, vomiting).        CONTINUE taking these medications    aspirin 81 MG Chew  aspirin 81 mg chewable tablet   CHEW AND SWALLOW 1 TABLET BY MOUTH DAILY     DEXCOM G6  Misc  Generic drug: blood-glucose meter,continuous  1 Device by Other route.     DEXCOM G6 SENSOR Arleen  Generic drug: blood-glucose sensor  Apply topically 4 (four) times daily.     DEXCOM G6 TRANSMITTER Arleen  Generic drug: blood-glucose transmitter  1 Device by Other route.     ergocalciferol 50,000 unit Cap  Commonly known as: ERGOCALCIFEROL  Take 50,000 Units by mouth every 30 days.     insulin aspart U-100 100 unit/mL  injection  Commonly known as: NovoLOG  Inject 5 Units into the skin with lunch.     insulin glargine 100 units/mL SubQ pen  28 Units every evening.     JARDIANCE 10 mg tablet  Generic drug: empagliflozin  Take 10 mg by mouth every morning.     lisinopriL 20 MG tablet  Commonly known as: PRINIVIL,ZESTRIL  Take 20 mg by mouth once daily.     venlafaxine 37.5 MG 24 hr capsule  Commonly known as: EFFEXOR XR  Take 1 capsule (37.5 mg total) by mouth once daily.            Indwelling Lines/Drains at time of discharge:   Lines/Drains/Airways     None                 Time spent on the discharge of patient: 45 minutes         Venkatesh Colmenares MD  Department of Hospital Medicine  [facility] ([place])

## 2023-06-08 NOTE — HOSPITAL COURSE
Patient was admitted with IVF and glucose control. She was placed on scheduled reglan and her N/V resolved. GI saw her in concert with no further workup to be done. She has a referral for GI for colonoscopy due to diarrhea for evaluation. Counseled on needing to control her sugars more effectively which starts with diet control and strict adherence to her insulin regimen. To follow up with PCP.